# Patient Record
Sex: MALE | Race: WHITE | Employment: FULL TIME | URBAN - METROPOLITAN AREA
[De-identification: names, ages, dates, MRNs, and addresses within clinical notes are randomized per-mention and may not be internally consistent; named-entity substitution may affect disease eponyms.]

---

## 2023-04-14 LAB
ALANINE AMINOTRANSFERASE (SGPT) (U/L) IN SER/PLAS: 29 U/L (ref 10–52)
ALBUMIN (G/DL) IN SER/PLAS: 4.3 G/DL (ref 3.4–5)
ALKALINE PHOSPHATASE (U/L) IN SER/PLAS: 84 U/L (ref 33–120)
ANION GAP IN SER/PLAS: 15 MMOL/L (ref 10–20)
ASPARTATE AMINOTRANSFERASE (SGOT) (U/L) IN SER/PLAS: 35 U/L (ref 9–39)
BILIRUBIN TOTAL (MG/DL) IN SER/PLAS: 0.6 MG/DL (ref 0–1.2)
CALCIUM (MG/DL) IN SER/PLAS: 9.8 MG/DL (ref 8.6–10.6)
CARBON DIOXIDE, TOTAL (MMOL/L) IN SER/PLAS: 27 MMOL/L (ref 21–32)
CHLORIDE (MMOL/L) IN SER/PLAS: 103 MMOL/L (ref 98–107)
CHOLESTEROL (MG/DL) IN SER/PLAS: 179 MG/DL (ref 0–199)
CHOLESTEROL IN HDL (MG/DL) IN SER/PLAS: 63 MG/DL
CHOLESTEROL/HDL RATIO: 2.8
CREATININE (MG/DL) IN SER/PLAS: 1.25 MG/DL (ref 0.5–1.3)
GFR MALE: 68 ML/MIN/1.73M2
GLUCOSE (MG/DL) IN SER/PLAS: 86 MG/DL (ref 74–99)
LDL: 67 MG/DL (ref 0–99)
NON HDL CHOLESTEROL: 116 MG/DL
POTASSIUM (MMOL/L) IN SER/PLAS: 4.6 MMOL/L (ref 3.5–5.3)
PROTEIN TOTAL: 6.9 G/DL (ref 6.4–8.2)
SODIUM (MMOL/L) IN SER/PLAS: 140 MMOL/L (ref 136–145)
TRIGLYCERIDE (MG/DL) IN SER/PLAS: 244 MG/DL (ref 0–149)
UREA NITROGEN (MG/DL) IN SER/PLAS: 17 MG/DL (ref 6–23)
VLDL: 49 MG/DL (ref 0–40)

## 2023-05-02 ENCOUNTER — OFFICE VISIT (OUTPATIENT)
Dept: PRIMARY CARE | Facility: CLINIC | Age: 53
End: 2023-05-02
Payer: COMMERCIAL

## 2023-05-02 ENCOUNTER — LAB (OUTPATIENT)
Dept: LAB | Facility: LAB | Age: 53
End: 2023-05-02
Payer: COMMERCIAL

## 2023-05-02 VITALS
HEIGHT: 72 IN | OXYGEN SATURATION: 96 % | BODY MASS INDEX: 31.29 KG/M2 | SYSTOLIC BLOOD PRESSURE: 124 MMHG | HEART RATE: 90 BPM | WEIGHT: 231 LBS | DIASTOLIC BLOOD PRESSURE: 80 MMHG

## 2023-05-02 DIAGNOSIS — Z13.0 SCREENING FOR DEFICIENCY ANEMIA: ICD-10-CM

## 2023-05-02 DIAGNOSIS — Z13.29 SCREENING FOR THYROID DISORDER: ICD-10-CM

## 2023-05-02 DIAGNOSIS — M10.9 GOUT, UNSPECIFIED CAUSE, UNSPECIFIED CHRONICITY, UNSPECIFIED SITE: ICD-10-CM

## 2023-05-02 DIAGNOSIS — Z23 IMMUNIZATION DUE: ICD-10-CM

## 2023-05-02 DIAGNOSIS — Z13.6 ENCOUNTER FOR SCREENING FOR CARDIOVASCULAR DISORDERS: ICD-10-CM

## 2023-05-02 DIAGNOSIS — E55.9 VITAMIN D DEFICIENCY: ICD-10-CM

## 2023-05-02 DIAGNOSIS — E78.5 HYPERLIPIDEMIA, UNSPECIFIED HYPERLIPIDEMIA TYPE: ICD-10-CM

## 2023-05-02 DIAGNOSIS — Z00.00 ROUTINE GENERAL MEDICAL EXAMINATION AT A HEALTH CARE FACILITY: Primary | ICD-10-CM

## 2023-05-02 DIAGNOSIS — Z12.5 PROSTATE CANCER SCREENING: ICD-10-CM

## 2023-05-02 DIAGNOSIS — Z00.00 ROUTINE GENERAL MEDICAL EXAMINATION AT A HEALTH CARE FACILITY: ICD-10-CM

## 2023-05-02 DIAGNOSIS — I10 PRIMARY HYPERTENSION: ICD-10-CM

## 2023-05-02 DIAGNOSIS — E66.3 OVERWEIGHT: ICD-10-CM

## 2023-05-02 PROBLEM — R07.89 CHEST PAIN, ATYPICAL: Status: ACTIVE | Noted: 2023-05-02

## 2023-05-02 PROBLEM — I25.10 CORONARY ARTERIOSCLEROSIS: Status: ACTIVE | Noted: 2020-07-09

## 2023-05-02 PROBLEM — R03.0 ELEVATED BLOOD-PRESSURE READING WITHOUT DIAGNOSIS OF HYPERTENSION: Status: ACTIVE | Noted: 2020-07-09

## 2023-05-02 PROBLEM — L40.50 PSORIASIS WITH ARTHROPATHY (MULTI): Status: ACTIVE | Noted: 2020-07-09

## 2023-05-02 PROBLEM — K44.9 HIATAL HERNIA: Status: ACTIVE | Noted: 2023-05-02

## 2023-05-02 PROBLEM — R13.10 SWALLOWING PROBLEM: Status: ACTIVE | Noted: 2020-07-09

## 2023-05-02 PROBLEM — I51.89 FAMILIAL HEART DISEASE: Status: ACTIVE | Noted: 2023-05-02

## 2023-05-02 PROBLEM — N52.9 ERECTILE DISORDER: Status: ACTIVE | Noted: 2023-05-02

## 2023-05-02 PROBLEM — G47.33 OSA (OBSTRUCTIVE SLEEP APNEA): Status: ACTIVE | Noted: 2023-05-02

## 2023-05-02 PROBLEM — R93.89 ABNORMAL COMPUTED TOMOGRAPHY SCAN: Status: ACTIVE | Noted: 2020-07-09

## 2023-05-02 PROBLEM — R13.10 DYSPHAGIA: Status: ACTIVE | Noted: 2023-05-02

## 2023-05-02 PROBLEM — M17.9 OSTEOARTHRITIS OF KNEE: Status: ACTIVE | Noted: 2023-05-02

## 2023-05-02 PROBLEM — M54.16 LUMBAR RADICULOPATHY: Status: ACTIVE | Noted: 2020-02-14

## 2023-05-02 PROBLEM — I25.10 CAD (CORONARY ARTERY DISEASE): Status: ACTIVE | Noted: 2023-05-02

## 2023-05-02 PROBLEM — M25.462 EFFUSION, LEFT KNEE: Status: ACTIVE | Noted: 2020-07-09

## 2023-05-02 PROBLEM — R06.83 HABITUAL SNORING: Status: ACTIVE | Noted: 2023-05-02

## 2023-05-02 PROBLEM — E66.9 OBESITY: Status: ACTIVE | Noted: 2020-07-09

## 2023-05-02 PROBLEM — I51.9 HEART DISEASE: Status: ACTIVE | Noted: 2020-07-09

## 2023-05-02 PROBLEM — R29.898 HIP WEAKNESS: Status: ACTIVE | Noted: 2020-02-14

## 2023-05-02 PROBLEM — M12.9 ARTHROPATHY: Status: ACTIVE | Noted: 2023-05-02

## 2023-05-02 PROBLEM — L40.50 PSORIATIC ARTHROPATHY (MULTI): Status: ACTIVE | Noted: 2023-05-02

## 2023-05-02 PROBLEM — R13.10 SWALLOWING PROBLEM: Status: ACTIVE | Noted: 2023-05-02

## 2023-05-02 PROCEDURE — 90750 HZV VACC RECOMBINANT IM: CPT | Performed by: STUDENT IN AN ORGANIZED HEALTH CARE EDUCATION/TRAINING PROGRAM

## 2023-05-02 PROCEDURE — 81001 URINALYSIS AUTO W/SCOPE: CPT

## 2023-05-02 PROCEDURE — 90472 IMMUNIZATION ADMIN EACH ADD: CPT | Performed by: STUDENT IN AN ORGANIZED HEALTH CARE EDUCATION/TRAINING PROGRAM

## 2023-05-02 PROCEDURE — 3079F DIAST BP 80-89 MM HG: CPT | Performed by: STUDENT IN AN ORGANIZED HEALTH CARE EDUCATION/TRAINING PROGRAM

## 2023-05-02 PROCEDURE — 90471 IMMUNIZATION ADMIN: CPT | Performed by: STUDENT IN AN ORGANIZED HEALTH CARE EDUCATION/TRAINING PROGRAM

## 2023-05-02 PROCEDURE — 99396 PREV VISIT EST AGE 40-64: CPT | Performed by: STUDENT IN AN ORGANIZED HEALTH CARE EDUCATION/TRAINING PROGRAM

## 2023-05-02 PROCEDURE — 84550 ASSAY OF BLOOD/URIC ACID: CPT

## 2023-05-02 PROCEDURE — 85027 COMPLETE CBC AUTOMATED: CPT

## 2023-05-02 PROCEDURE — 36415 COLL VENOUS BLD VENIPUNCTURE: CPT

## 2023-05-02 PROCEDURE — 84154 ASSAY OF PSA FREE: CPT

## 2023-05-02 PROCEDURE — 84443 ASSAY THYROID STIM HORMONE: CPT

## 2023-05-02 PROCEDURE — 84153 ASSAY OF PSA TOTAL: CPT

## 2023-05-02 PROCEDURE — 3074F SYST BP LT 130 MM HG: CPT | Performed by: STUDENT IN AN ORGANIZED HEALTH CARE EDUCATION/TRAINING PROGRAM

## 2023-05-02 RX ORDER — MELOXICAM 7.5 MG/1
7.5 TABLET ORAL
COMMUNITY
Start: 2012-03-21 | End: 2023-05-02 | Stop reason: ALTCHOICE

## 2023-05-02 RX ORDER — TAMSULOSIN HYDROCHLORIDE 0.4 MG/1
1 CAPSULE ORAL DAILY
COMMUNITY
Start: 2022-06-11 | End: 2023-05-02 | Stop reason: ALTCHOICE

## 2023-05-02 RX ORDER — LISINOPRIL 20 MG/1
1 TABLET ORAL DAILY
COMMUNITY
Start: 2015-07-06

## 2023-05-02 RX ORDER — AZITHROMYCIN 250 MG/1
TABLET, FILM COATED ORAL
COMMUNITY
Start: 2019-05-14 | End: 2023-05-02 | Stop reason: ALTCHOICE

## 2023-05-02 RX ORDER — ASPIRIN 81 MG/1
1 TABLET ORAL DAILY
COMMUNITY
Start: 2019-06-10

## 2023-05-02 RX ORDER — ATORVASTATIN CALCIUM 80 MG/1
80 TABLET, FILM COATED ORAL DAILY
COMMUNITY
End: 2024-05-01 | Stop reason: SDUPTHER

## 2023-05-02 RX ORDER — ALBUTEROL SULFATE 90 UG/1
2 AEROSOL, METERED RESPIRATORY (INHALATION) EVERY 6 HOURS PRN
COMMUNITY
Start: 2020-02-20 | End: 2023-05-02 | Stop reason: ALTCHOICE

## 2023-05-02 RX ORDER — ATOVAQUONE AND PROGUANIL HYDROCHLORIDE 250; 100 MG/1; MG/1
TABLET, FILM COATED ORAL
COMMUNITY
Start: 2019-05-14 | End: 2023-05-02 | Stop reason: ALTCHOICE

## 2023-05-02 RX ORDER — KETOROLAC TROMETHAMINE 10 MG/1
1 TABLET, FILM COATED ORAL EVERY 8 HOURS PRN
COMMUNITY
Start: 2022-06-11 | End: 2023-05-02 | Stop reason: ALTCHOICE

## 2023-05-02 RX ORDER — COLCHICINE 0.6 MG/1
TABLET ORAL
COMMUNITY
End: 2023-05-02 | Stop reason: ALTCHOICE

## 2023-05-02 RX ORDER — CELECOXIB 200 MG/1
1 CAPSULE ORAL DAILY
COMMUNITY
Start: 2018-02-21 | End: 2023-10-31 | Stop reason: SDUPTHER

## 2023-05-02 RX ORDER — ALLOPURINOL 100 MG/1
1 TABLET ORAL DAILY
COMMUNITY
Start: 2022-11-02 | End: 2023-05-02 | Stop reason: ALTCHOICE

## 2023-05-02 RX ORDER — METHYLPREDNISOLONE 4 MG/1
TABLET ORAL
COMMUNITY
Start: 2022-11-02 | End: 2023-05-02 | Stop reason: ALTCHOICE

## 2023-05-02 RX ORDER — SILDENAFIL CITRATE 20 MG/1
TABLET ORAL
COMMUNITY
Start: 2021-04-01 | End: 2023-05-02 | Stop reason: ALTCHOICE

## 2023-05-02 RX ORDER — ALLOPURINOL 300 MG/1
300 TABLET ORAL DAILY
COMMUNITY
End: 2023-12-07 | Stop reason: SDUPTHER

## 2023-05-02 NOTE — PATIENT INSTRUCTIONS
1. Wellness visit.  No concerns on exam.  Screening labs ordered today please be fasting.  Labs from cardiology showed excellent cholesterol panel.  Colonoscopy is up-to-date until 2029.  First Shingrix vaccine given today.  Follow-up in 2 months either with your pharmacy or here for a nurse visit for second shot.    2. Continue routine cardiology visits.  Continue current meds.  Call if refills needed.     3. Arthritis currently under good control on meds.  Has adjusted diet improved his exercise.  Can continue routine follow-ups with rheumatology.

## 2023-05-02 NOTE — PROGRESS NOTES
"Subjective   Patient ID: Ryan Moore is a 52 y.o. male who presents for Annual Exam (He is not fasting.).    HPI comes in for wellness visit.    Review of Systems  Constitutional: NO F, chills, or sweats  Eyes: no blurred vision or visual disturbance  ENT: no hearing loss, no congestion, no nasal discharge, no hoarseness and no sore throat.   Cardiovascular: no chest pain, no edema, no palps and no syncope.   Respiratory: no cough,no s.o.b. and no wheezing  Gastrointestinal: no abdominal pain, No C/D no N/V, no blood in stools  Genitourinary: no dysuria, no change in urinary frequency, no urinary hesitancy and no feelings of urinary urgency.   Musculoskeletal: no arthralgias,  no back pain and no myalgias.   Integumentary: no new skin lesions and no rashes.   Neurological: no difficulty walking, no headache, no limb weakness, no numbness and no tingling.   Psychiatric: no anxiety, no depression, no anhedonia and no substance use disorders.   Endocrine: no recent weight gain and no recent weight loss.   Hematologic/Lymphatic: no tendency for easy bruising and no swollen glands.  Objective   /80 (BP Location: Left arm, Patient Position: Sitting, BP Cuff Size: Large adult)   Pulse 90   Ht 1.826 m (5' 11.89\")   Wt 105 kg (231 lb)   SpO2 96%   BMI 31.43 kg/m²     Physical Exam  gen- a & o x 3, nad, pleasant  heent- eomi, perrla, ear canals patent, TM's non-erythematous, no fluid, frontal and maxillary sinus's nontender  neck- supple, nontender, no palpable or enlarged nodes, no thyromegaly  heart- rrr, no murmurs  lungs- cta b/l , no w/r/r  chest- symmetric, nontender  ab- soft, nontender, no palpable organomegaly, postive bowel sounds  ex's- no c/c/e  neuro- CNs 2-12 grossly intact, full sensation and strength in all extremities    Assessment/Plan     1. Wellness visit.  No concerns on exam.  Screening labs ordered today please be fasting.  Labs from cardiology showed excellent cholesterol panel.  Colonoscopy " is up-to-date until 2029.  First Shingrix vaccine given today.  Follow-up in 2 months either with your pharmacy or here for a nurse visit for second shot.    2. Continue routine cardiology visits.  Continue current meds.  Call if refills needed.     3. Arthritis currently under good control on meds.  Has adjusted diet improved his exercise.  Can continue routine follow-ups with rheumatology.

## 2023-05-03 LAB
APPEARANCE, URINE: ABNORMAL
BILIRUBIN, URINE: NEGATIVE
BLOOD, URINE: ABNORMAL
COLOR, URINE: ABNORMAL
ERYTHROCYTE DISTRIBUTION WIDTH (RATIO) BY AUTOMATED COUNT: 11.9 % (ref 11.5–14.5)
ERYTHROCYTE MEAN CORPUSCULAR HEMOGLOBIN CONCENTRATION (G/DL) BY AUTOMATED: 32.2 G/DL (ref 32–36)
ERYTHROCYTE MEAN CORPUSCULAR VOLUME (FL) BY AUTOMATED COUNT: 97 FL (ref 80–100)
ERYTHROCYTES (10*6/UL) IN BLOOD BY AUTOMATED COUNT: 4.38 X10E12/L (ref 4.5–5.9)
GLUCOSE, URINE: NEGATIVE MG/DL
HEMATOCRIT (%) IN BLOOD BY AUTOMATED COUNT: 42.5 % (ref 41–52)
HEMOGLOBIN (G/DL) IN BLOOD: 13.7 G/DL (ref 13.5–17.5)
KETONES, URINE: NEGATIVE MG/DL
LEUKOCYTE ESTERASE, URINE: NEGATIVE
LEUKOCYTES (10*3/UL) IN BLOOD BY AUTOMATED COUNT: 8.3 X10E9/L (ref 4.4–11.3)
MUCUS, URINE: NORMAL /LPF
NITRITE, URINE: NEGATIVE
NRBC (PER 100 WBCS) BY AUTOMATED COUNT: 0 /100 WBC (ref 0–0)
PH, URINE: 6 (ref 5–8)
PLATELETS (10*3/UL) IN BLOOD AUTOMATED COUNT: 217 X10E9/L (ref 150–450)
PROTEIN, URINE: NEGATIVE MG/DL
RBC, URINE: 1 /HPF (ref 0–5)
SPECIFIC GRAVITY, URINE: 1.02 (ref 1–1.03)
SQUAMOUS EPITHELIAL CELLS, URINE: <1 /HPF
THYROTROPIN (MIU/L) IN SER/PLAS BY DETECTION LIMIT <= 0.05 MIU/L: 2.21 MIU/L (ref 0.44–3.98)
URATE (MG/DL) IN SER/PLAS: 5.3 MG/DL (ref 4–7.5)
UROBILINOGEN, URINE: <2 MG/DL (ref 0–1.9)
WBC, URINE: 1 /HPF (ref 0–5)

## 2023-05-06 LAB
PROSTATE SPECIFIC AG (NG/ML) IN SER/PLAS: 0.4 NG/ML (ref 0–4)
PROSTATE SPECIFIC AG FREE (NG/ML) IN SER/PLAS: 0.2 NG/ML
PROSTATE SPECIFIC AG FREE/PROSTATE SPECIFIC AG TOTAL IN SER/PLAS: 50 %

## 2023-10-31 DIAGNOSIS — M12.9 ARTHROPATHY: Primary | ICD-10-CM

## 2023-10-31 RX ORDER — CELECOXIB 200 MG/1
200 CAPSULE ORAL DAILY
Qty: 30 CAPSULE | Refills: 0 | Status: SHIPPED | OUTPATIENT
Start: 2023-10-31 | End: 2023-12-07 | Stop reason: SDUPTHER

## 2023-11-29 ENCOUNTER — APPOINTMENT (OUTPATIENT)
Dept: RHEUMATOLOGY | Facility: CLINIC | Age: 53
End: 2023-11-29
Payer: COMMERCIAL

## 2023-12-05 PROBLEM — M65.979 TENOSYNOVITIS OF ANKLE: Status: ACTIVE | Noted: 2023-12-05

## 2023-12-05 PROBLEM — M65.9 TENOSYNOVITIS OF ANKLE: Status: ACTIVE | Noted: 2023-12-05

## 2023-12-05 PROBLEM — C44.329 SQUAMOUS CELL CARCINOMA OF SKIN OF OTHER PARTS OF FACE: Status: ACTIVE | Noted: 2021-10-21

## 2023-12-05 PROBLEM — D48.5 NEOPLASM OF UNCERTAIN BEHAVIOR OF SKIN: Status: ACTIVE | Noted: 2021-10-21

## 2023-12-05 RX ORDER — SILDENAFIL CITRATE 20 MG/1
TABLET ORAL
COMMUNITY
Start: 2021-04-01 | End: 2024-04-25

## 2023-12-06 ENCOUNTER — OFFICE VISIT (OUTPATIENT)
Dept: RHEUMATOLOGY | Facility: CLINIC | Age: 53
End: 2023-12-06
Payer: COMMERCIAL

## 2023-12-06 ENCOUNTER — LAB (OUTPATIENT)
Dept: LAB | Facility: LAB | Age: 53
End: 2023-12-06
Payer: COMMERCIAL

## 2023-12-06 VITALS
WEIGHT: 233.7 LBS | DIASTOLIC BLOOD PRESSURE: 96 MMHG | HEART RATE: 85 BPM | BODY MASS INDEX: 29.99 KG/M2 | SYSTOLIC BLOOD PRESSURE: 171 MMHG | HEIGHT: 74 IN

## 2023-12-06 DIAGNOSIS — M10.9 GOUT, UNSPECIFIED CAUSE, UNSPECIFIED CHRONICITY, UNSPECIFIED SITE: ICD-10-CM

## 2023-12-06 DIAGNOSIS — L40.50 PSORIATIC ARTHROPATHY (MULTI): ICD-10-CM

## 2023-12-06 DIAGNOSIS — M17.0 OSTEOARTHRITIS OF BOTH KNEES, UNSPECIFIED OSTEOARTHRITIS TYPE: ICD-10-CM

## 2023-12-06 DIAGNOSIS — L40.50 PSORIATIC ARTHROPATHY (MULTI): Primary | ICD-10-CM

## 2023-12-06 LAB
25(OH)D3 SERPL-MCNC: 26 NG/ML (ref 30–100)
ALBUMIN SERPL BCP-MCNC: 4.2 G/DL (ref 3.4–5)
ALP SERPL-CCNC: 84 U/L (ref 33–120)
ALT SERPL W P-5'-P-CCNC: 16 U/L (ref 10–52)
ANION GAP SERPL CALC-SCNC: 14 MMOL/L (ref 10–20)
AST SERPL W P-5'-P-CCNC: 20 U/L (ref 9–39)
BILIRUB SERPL-MCNC: 0.6 MG/DL (ref 0–1.2)
BUN SERPL-MCNC: 19 MG/DL (ref 6–23)
CALCIUM SERPL-MCNC: 9.8 MG/DL (ref 8.6–10.6)
CHLORIDE SERPL-SCNC: 104 MMOL/L (ref 98–107)
CK SERPL-CCNC: 103 U/L (ref 0–325)
CO2 SERPL-SCNC: 28 MMOL/L (ref 21–32)
CREAT SERPL-MCNC: 1.27 MG/DL (ref 0.5–1.3)
ERYTHROCYTE [DISTWIDTH] IN BLOOD BY AUTOMATED COUNT: 12 % (ref 11.5–14.5)
GFR SERPL CREATININE-BSD FRML MDRD: 67 ML/MIN/1.73M*2
GLUCOSE SERPL-MCNC: 86 MG/DL (ref 74–99)
HCT VFR BLD AUTO: 44.7 % (ref 41–52)
HGB BLD-MCNC: 14.3 G/DL (ref 13.5–17.5)
MCH RBC QN AUTO: 31.4 PG (ref 26–34)
MCHC RBC AUTO-ENTMCNC: 32 G/DL (ref 32–36)
MCV RBC AUTO: 98 FL (ref 80–100)
NRBC BLD-RTO: 0 /100 WBCS (ref 0–0)
PLATELET # BLD AUTO: 188 X10*3/UL (ref 150–450)
POTASSIUM SERPL-SCNC: 4.7 MMOL/L (ref 3.5–5.3)
PROT SERPL-MCNC: 7 G/DL (ref 6.4–8.2)
RBC # BLD AUTO: 4.56 X10*6/UL (ref 4.5–5.9)
SODIUM SERPL-SCNC: 141 MMOL/L (ref 136–145)
URATE SERPL-MCNC: 4.5 MG/DL (ref 4–7.5)
WBC # BLD AUTO: 7.3 X10*3/UL (ref 4.4–11.3)

## 2023-12-06 PROCEDURE — 3077F SYST BP >= 140 MM HG: CPT | Performed by: INTERNAL MEDICINE

## 2023-12-06 PROCEDURE — 36415 COLL VENOUS BLD VENIPUNCTURE: CPT

## 2023-12-06 PROCEDURE — 3080F DIAST BP >= 90 MM HG: CPT | Performed by: INTERNAL MEDICINE

## 2023-12-06 PROCEDURE — 82306 VITAMIN D 25 HYDROXY: CPT

## 2023-12-06 PROCEDURE — 85027 COMPLETE CBC AUTOMATED: CPT

## 2023-12-06 PROCEDURE — 84550 ASSAY OF BLOOD/URIC ACID: CPT

## 2023-12-06 PROCEDURE — 80053 COMPREHEN METABOLIC PANEL: CPT

## 2023-12-06 PROCEDURE — 82550 ASSAY OF CK (CPK): CPT

## 2023-12-06 PROCEDURE — 99214 OFFICE O/P EST MOD 30 MIN: CPT | Performed by: INTERNAL MEDICINE

## 2023-12-06 NOTE — PROGRESS NOTES
Follow-up Rheumatology Patient Visit    Chief Complaint:  Ryan Moore is a 53 y.o. male presenting today for Follow-up.    History of Presenting Problem:   54 y/o male with Psoriasis and Gout present for f/u. He had been tolerating Allopurinol and Celebrex 200 mg daily  He denies any acute issues in his joints.  He reports no limitation with joint range of motion occasionally will hear a lot of joint noises in the left knee.  However he is able to do a lot of activities including yoga.  He notices occasional muscle cramps      Problem List:   Patient Active Problem List   Diagnosis    Erectile disorder    Arthropathy    CAD (coronary artery disease)    Chest pain, atypical    Dysphagia    Familial heart disease    HTN (hypertension)    Gout    Habitual snoring    Hiatal hernia    Hyperlipidemia    ULISES (obstructive sleep apnea)    Osteoarthritis of knee    Psoriatic arthropathy (CMS/HCC)    Swallowing problem    Vitamin D deficiency    Abnormal computed tomography scan    Effusion, left knee    Elevated blood-pressure reading without diagnosis of hypertension    Lumbar radiculopathy    Obesity    Overweight    Hip weakness    Coronary arteriosclerosis    Atypical chest pain    Heart disease    Psoriasis with arthropathy (CMS/HCC)    Tenosynovitis of foot    Squamous cell carcinoma of skin of other parts of face    Neoplasm of uncertain behavior of skin    Tenosynovitis of ankle       Past Medical History:   Past Medical History:   Diagnosis Date    Arthropathic psoriasis, unspecified (CMS/HCC) 11/24/2013    Psoriatic arthropathy    Arthropathic psoriasis, unspecified (CMS/HCC) 02/21/2018    Psoriasis with arthropathy    Effusion, left knee 08/22/2018    Effusion of left knee    Encounter for health counseling related to travel 05/14/2019    Counseling for travel    Encounter for screening for malignant neoplasm of rectum     Screening for malignant neoplasm of the rectum    Other long term (current) drug therapy      "High risk medication use    Overweight 12/11/2019    Overweight    Personal history of diseases of the skin and subcutaneous tissue     History of psoriasis    Personal history of other diseases of the respiratory system 05/31/2017    History of sore throat    Personal history of other endocrine, nutritional and metabolic disease 06/23/2015    History of hypercholesterolemia    Personal history of other specified conditions 03/22/2021    History of chest pain       Surgical History:   Past Surgical History:   Procedure Laterality Date    APPENDECTOMY  06/10/2019    Appendectomy    KNEE ARTHROSCOPY W/ DEBRIDEMENT  06/10/2019    Arthroscopy Knee Left    REFRACTIVE SURGERY  06/10/2019    Corneal LASIK        Allergies: No Known Allergies    Medications:   Current Outpatient Medications:     allopurinol (Zyloprim) 300 mg tablet, Take 1 tablet (300 mg) by mouth once daily. as directed, Disp: , Rfl:     aspirin 81 mg EC tablet, Take 1 tablet (81 mg) by mouth once daily., Disp: , Rfl:     atorvastatin (Lipitor) 80 mg tablet, Take 1 tablet (80 mg) by mouth once daily., Disp: , Rfl:     celecoxib (CeleBREX) 200 mg capsule, Take 1 capsule (200 mg) by mouth once daily., Disp: 30 capsule, Rfl: 0    cetirizine (ZyrTEC) 2.5 MG split tablet, Take by mouth., Disp: , Rfl:     lisinopril 20 mg tablet, Take 1 tablet (20 mg) by mouth once daily., Disp: , Rfl:     sildenafil (Revatio) 20 mg tablet, Take by mouth., Disp: , Rfl:       Objective   Physical Examination:    Visit Vitals  BP (!) 171/96   Pulse 85   Ht 1.88 m (6' 2\")   Wt 106 kg (233 lb 11.2 oz)   BMI 30.01 kg/m²   Smoking Status Never Assessed   BSA 2.35 m²        Gen: NAD, A&O x 3  HEENT: clear sclera and conjunctiva,     Musculoskeletal:   Neck; WNL, full ROM  Shoulder: WNL, full ROM  Elbow:WNL, full ROM, no effusion noted  Wrist and fingers;no active synovitis noted, Full ROM in the Wrist , Good fist and   Knees:  No effusions or crepitation, full ROM.  Hips; WNL, full " ROM, Negative Chet test  Ankle, Feet; WNL, full ROM    Skin: No rashes or lesions seen, no nail changes  Neuro: A&O x3, Normal Gait    Procedures :None    Orders:  Orders Placed This Encounter   Procedures    Uric Acid    Creatine Kinase    Vitamin D 25-Hydroxy,Total (for eval of Vitamin D levels)    Comprehensive Metabolic Panel    CBC        Provider Impression:   Assessment/Plan   Encounter Diagnoses   Name Primary?    Psoriatic arthropathy (CMS/HCC) Yes    Gout, unspecified cause, unspecified chronicity, unspecified site     Osteoarthritis of both knees, unspecified osteoarthritis type         52 y/o male with Hx of seronegative inflammatory arthritis, Hx of Psoriasis present with joint complaint in the left knee and thumb, which seem to be resolved with Diet change. Current work show show joint changes consistent with OA and and also possible inflammatory arthritis given joint changes in symmetric joints including in the wrist. Also noted to have elevated uric acid which suggested underlying gout. MRI show tricompartment arthritis and ligament derangement.   -Continue Celebrex 200 mg daily as needed can try to take it every other day, discuss PPI therapy for GI prophylaxis  -Continue Allopurinol, patient was still on 300 mg, last note indicated we had recommended increasing to 400 however we will repeat uric acid and go from there  -F/u in 6 months

## 2023-12-07 ENCOUNTER — TELEPHONE (OUTPATIENT)
Dept: RHEUMATOLOGY | Facility: CLINIC | Age: 53
End: 2023-12-07
Payer: COMMERCIAL

## 2023-12-07 DIAGNOSIS — M12.9 ARTHROPATHY: ICD-10-CM

## 2023-12-07 DIAGNOSIS — E55.9 VITAMIN D DEFICIENCY: Primary | ICD-10-CM

## 2023-12-07 DIAGNOSIS — M10.9 GOUT, UNSPECIFIED CAUSE, UNSPECIFIED CHRONICITY, UNSPECIFIED SITE: Primary | ICD-10-CM

## 2023-12-07 RX ORDER — ALLOPURINOL 300 MG/1
300 TABLET ORAL DAILY
Qty: 90 TABLET | Refills: 1 | Status: SHIPPED | OUTPATIENT
Start: 2023-12-07 | End: 2024-03-06

## 2023-12-07 RX ORDER — ERGOCALCIFEROL 1.25 MG/1
50000 CAPSULE ORAL
Qty: 4 CAPSULE | Refills: 0 | Status: SHIPPED | OUTPATIENT
Start: 2023-12-07 | End: 2024-01-04

## 2023-12-07 RX ORDER — CELECOXIB 200 MG/1
200 CAPSULE ORAL DAILY
Qty: 90 CAPSULE | Refills: 0 | Status: SHIPPED | OUTPATIENT
Start: 2023-12-07 | End: 2024-03-06

## 2023-12-07 NOTE — TELEPHONE ENCOUNTER
No answer, left this detailed message on patient voicemail  ----- Message from Mirlande Olson DO sent at 12/7/2023 10:19 AM EST -----  Please let patient know that  vitamin D is low, recommend they take high-dose vitamin D to improve their levels which will help immune system, bones and joints  Send 50,000 units weekly  Q:4  R:0  When they finish high dose vitamin D to start taking 3000 units daily OTC

## 2024-02-26 ENCOUNTER — APPOINTMENT (OUTPATIENT)
Dept: SLEEP MEDICINE | Facility: CLINIC | Age: 54
End: 2024-02-26
Payer: COMMERCIAL

## 2024-03-25 ENCOUNTER — APPOINTMENT (OUTPATIENT)
Dept: SLEEP MEDICINE | Facility: CLINIC | Age: 54
End: 2024-03-25
Payer: COMMERCIAL

## 2024-04-16 DIAGNOSIS — M17.0 OSTEOARTHRITIS OF BOTH KNEES, UNSPECIFIED OSTEOARTHRITIS TYPE: Primary | ICD-10-CM

## 2024-04-16 RX ORDER — CELECOXIB 200 MG/1
200 CAPSULE ORAL DAILY
Qty: 30 CAPSULE | Refills: 1 | Status: SHIPPED | OUTPATIENT
Start: 2024-04-16 | End: 2024-06-05 | Stop reason: SDUPTHER

## 2024-04-25 DIAGNOSIS — N52.9 ERECTILE DYSFUNCTION, UNSPECIFIED ERECTILE DYSFUNCTION TYPE: Primary | ICD-10-CM

## 2024-04-25 RX ORDER — ALLOPURINOL 300 MG/1
TABLET ORAL
COMMUNITY
Start: 2024-04-05 | End: 2024-06-05 | Stop reason: SDUPTHER

## 2024-04-25 RX ORDER — SILDENAFIL CITRATE 20 MG/1
TABLET ORAL
Qty: 50 TABLET | Refills: 11 | Status: SHIPPED | OUTPATIENT
Start: 2024-04-25

## 2024-05-01 DIAGNOSIS — E78.5 HYPERLIPIDEMIA, UNSPECIFIED HYPERLIPIDEMIA TYPE: Primary | ICD-10-CM

## 2024-05-01 RX ORDER — ATORVASTATIN CALCIUM 80 MG/1
80 TABLET, FILM COATED ORAL DAILY
Qty: 90 TABLET | Refills: 3 | Status: SHIPPED | OUTPATIENT
Start: 2024-05-01

## 2024-06-05 ENCOUNTER — OFFICE VISIT (OUTPATIENT)
Dept: RHEUMATOLOGY | Facility: CLINIC | Age: 54
End: 2024-06-05
Payer: COMMERCIAL

## 2024-06-05 ENCOUNTER — LAB (OUTPATIENT)
Dept: LAB | Facility: LAB | Age: 54
End: 2024-06-05
Payer: COMMERCIAL

## 2024-06-05 VITALS
BODY MASS INDEX: 29.77 KG/M2 | HEIGHT: 74 IN | HEART RATE: 111 BPM | DIASTOLIC BLOOD PRESSURE: 80 MMHG | SYSTOLIC BLOOD PRESSURE: 148 MMHG | WEIGHT: 232 LBS | OXYGEN SATURATION: 94 % | TEMPERATURE: 98.2 F

## 2024-06-05 DIAGNOSIS — M25.562 CHRONIC PAIN OF LEFT KNEE: ICD-10-CM

## 2024-06-05 DIAGNOSIS — M10.9 GOUT, UNSPECIFIED CAUSE, UNSPECIFIED CHRONICITY, UNSPECIFIED SITE: ICD-10-CM

## 2024-06-05 DIAGNOSIS — G89.29 CHRONIC PAIN OF LEFT KNEE: ICD-10-CM

## 2024-06-05 DIAGNOSIS — E55.9 VITAMIN D DEFICIENCY: ICD-10-CM

## 2024-06-05 DIAGNOSIS — L40.50 PSORIATIC ARTHROPATHY (MULTI): ICD-10-CM

## 2024-06-05 DIAGNOSIS — M17.0 OSTEOARTHRITIS OF BOTH KNEES, UNSPECIFIED OSTEOARTHRITIS TYPE: ICD-10-CM

## 2024-06-05 DIAGNOSIS — M10.9 GOUT, UNSPECIFIED CAUSE, UNSPECIFIED CHRONICITY, UNSPECIFIED SITE: Primary | ICD-10-CM

## 2024-06-05 PROBLEM — M51.26 LUMBAR DISC HERNIATION: Status: ACTIVE | Noted: 2024-06-05

## 2024-06-05 LAB
25(OH)D3 SERPL-MCNC: 25 NG/ML (ref 30–100)
ALBUMIN SERPL BCP-MCNC: 4.6 G/DL (ref 3.4–5)
ALP SERPL-CCNC: 81 U/L (ref 33–120)
ALT SERPL W P-5'-P-CCNC: 30 U/L (ref 10–52)
ANION GAP SERPL CALC-SCNC: 16 MMOL/L (ref 10–20)
AST SERPL W P-5'-P-CCNC: 27 U/L (ref 9–39)
BILIRUB SERPL-MCNC: 0.6 MG/DL (ref 0–1.2)
BUN SERPL-MCNC: 30 MG/DL (ref 6–23)
CALCIUM SERPL-MCNC: 9.8 MG/DL (ref 8.6–10.6)
CHLORIDE SERPL-SCNC: 103 MMOL/L (ref 98–107)
CO2 SERPL-SCNC: 24 MMOL/L (ref 21–32)
CREAT SERPL-MCNC: 1.88 MG/DL (ref 0.5–1.3)
CRP SERPL-MCNC: 0.32 MG/DL
EGFRCR SERPLBLD CKD-EPI 2021: 42 ML/MIN/1.73M*2
ERYTHROCYTE [DISTWIDTH] IN BLOOD BY AUTOMATED COUNT: 12.5 % (ref 11.5–14.5)
ERYTHROCYTE [SEDIMENTATION RATE] IN BLOOD BY WESTERGREN METHOD: 29 MM/H (ref 0–20)
GLUCOSE SERPL-MCNC: 127 MG/DL (ref 74–99)
HCT VFR BLD AUTO: 44.1 % (ref 41–52)
HGB BLD-MCNC: 14.9 G/DL (ref 13.5–17.5)
MCH RBC QN AUTO: 32.1 PG (ref 26–34)
MCHC RBC AUTO-ENTMCNC: 33.8 G/DL (ref 32–36)
MCV RBC AUTO: 95 FL (ref 80–100)
NRBC BLD-RTO: 0 /100 WBCS (ref 0–0)
PLATELET # BLD AUTO: 217 X10*3/UL (ref 150–450)
POTASSIUM SERPL-SCNC: 4.2 MMOL/L (ref 3.5–5.3)
PROT SERPL-MCNC: 7.1 G/DL (ref 6.4–8.2)
RBC # BLD AUTO: 4.64 X10*6/UL (ref 4.5–5.9)
SODIUM SERPL-SCNC: 139 MMOL/L (ref 136–145)
URATE SERPL-MCNC: 6.1 MG/DL (ref 4–7.5)
WBC # BLD AUTO: 10 X10*3/UL (ref 4.4–11.3)

## 2024-06-05 PROCEDURE — 84550 ASSAY OF BLOOD/URIC ACID: CPT

## 2024-06-05 PROCEDURE — 1036F TOBACCO NON-USER: CPT | Performed by: INTERNAL MEDICINE

## 2024-06-05 PROCEDURE — 86140 C-REACTIVE PROTEIN: CPT

## 2024-06-05 PROCEDURE — 80053 COMPREHEN METABOLIC PANEL: CPT

## 2024-06-05 PROCEDURE — 36415 COLL VENOUS BLD VENIPUNCTURE: CPT

## 2024-06-05 PROCEDURE — 99214 OFFICE O/P EST MOD 30 MIN: CPT | Performed by: INTERNAL MEDICINE

## 2024-06-05 PROCEDURE — 85652 RBC SED RATE AUTOMATED: CPT

## 2024-06-05 PROCEDURE — 85027 COMPLETE CBC AUTOMATED: CPT

## 2024-06-05 PROCEDURE — 20611 DRAIN/INJ JOINT/BURSA W/US: CPT | Performed by: INTERNAL MEDICINE

## 2024-06-05 PROCEDURE — 82306 VITAMIN D 25 HYDROXY: CPT

## 2024-06-05 PROCEDURE — 3077F SYST BP >= 140 MM HG: CPT | Performed by: INTERNAL MEDICINE

## 2024-06-05 PROCEDURE — 3079F DIAST BP 80-89 MM HG: CPT | Performed by: INTERNAL MEDICINE

## 2024-06-05 RX ORDER — CELECOXIB 200 MG/1
200 CAPSULE ORAL DAILY
Qty: 90 CAPSULE | Refills: 1 | Status: SHIPPED | OUTPATIENT
Start: 2024-06-05 | End: 2024-09-03

## 2024-06-05 RX ORDER — LIDOCAINE HYDROCHLORIDE 10 MG/ML
1 INJECTION INFILTRATION; PERINEURAL
Status: COMPLETED | OUTPATIENT
Start: 2024-06-05 | End: 2024-06-05

## 2024-06-05 RX ORDER — TRIAMCINOLONE ACETONIDE 40 MG/ML
40 INJECTION, SUSPENSION INTRA-ARTICULAR; INTRAMUSCULAR
Status: COMPLETED | OUTPATIENT
Start: 2024-06-05 | End: 2024-06-05

## 2024-06-05 RX ORDER — ALLOPURINOL 300 MG/1
300 TABLET ORAL DAILY
Qty: 90 TABLET | Refills: 1 | Status: SHIPPED | OUTPATIENT
Start: 2024-06-05 | End: 2024-09-03

## 2024-06-05 RX ADMIN — LIDOCAINE HYDROCHLORIDE 1 ML: 10 INJECTION INFILTRATION; PERINEURAL at 15:32

## 2024-06-05 RX ADMIN — TRIAMCINOLONE ACETONIDE 40 MG: 40 INJECTION, SUSPENSION INTRA-ARTICULAR; INTRAMUSCULAR at 15:32

## 2024-06-05 NOTE — PROGRESS NOTES
Follow-up Rheumatology Patient Visit    Chief Complaint:  Ryan Moore is a 53 y.o. male presenting today for Follow-up (6 Month follow up ).    History of Presenting Problem:   54 y/o male with Psoriasis and Gout present for f/u. He had been tolerating Allopurinol and Celebrex 200 mg daily  He denies any acute issues in his joints.  He reports no limitation with joint range of motion occasionally will hear a lot of joint noises in the left knee.  He is experiencing more pain and stiffness in this joint lately and is interested in a joint injection.  He recently was diagnosed with lumbar herniation between L4 and L5 and is received an injection and PT from pain management.      Problem List:   Patient Active Problem List   Diagnosis    Erectile disorder    Arthropathy    CAD (coronary artery disease)    Chest pain, atypical    Dysphagia    Familial heart disease    HTN (hypertension)    Gout    Habitual snoring    Hiatal hernia    Hyperlipidemia    ULISES (obstructive sleep apnea)    Osteoarthritis of knee    Psoriatic arthropathy (Multi)    Swallowing problem    Vitamin D deficiency    Abnormal computed tomography scan    Effusion, left knee    Elevated blood-pressure reading without diagnosis of hypertension    Lumbar radiculopathy    Obesity    Overweight    Hip weakness    Coronary arteriosclerosis    Atypical chest pain    Heart disease    Psoriasis with arthropathy (Multi)    Tenosynovitis of foot    Squamous cell carcinoma of skin of other parts of face    Neoplasm of uncertain behavior of skin    Tenosynovitis of ankle    Lumbar disc herniation       Past Medical History:   Past Medical History:   Diagnosis Date    Arthropathic psoriasis, unspecified (Multi) 11/24/2013    Psoriatic arthropathy    Arthropathic psoriasis, unspecified (Multi) 02/21/2018    Psoriasis with arthropathy    Coronary artery disease     Effusion, left knee 08/22/2018    Effusion of left knee    Encounter for health counseling related to  "travel 05/14/2019    Counseling for travel    Encounter for screening for malignant neoplasm of rectum     Screening for malignant neoplasm of the rectum    Hypertension     Other long term (current) drug therapy     High risk medication use    Overweight 12/11/2019    Overweight    Personal history of diseases of the skin and subcutaneous tissue     History of psoriasis    Personal history of other diseases of the respiratory system 05/31/2017    History of sore throat    Personal history of other endocrine, nutritional and metabolic disease 06/23/2015    History of hypercholesterolemia    Personal history of other specified conditions 03/22/2021    History of chest pain    Sciatica        Surgical History:   Past Surgical History:   Procedure Laterality Date    ANTERIOR CRUCIATE LIGAMENT REPAIR      APPENDECTOMY  06/10/2019    Appendectomy    KNEE ARTHROSCOPY W/ DEBRIDEMENT  06/10/2019    Arthroscopy Knee Left    REFRACTIVE SURGERY  06/10/2019    Corneal LASIK        Allergies: No Known Allergies    Medications:   Current Outpatient Medications:     allopurinol (Zyloprim) 300 mg tablet, , Disp: , Rfl:     aspirin 81 mg EC tablet, Take 1 tablet (81 mg) by mouth once daily., Disp: , Rfl:     atorvastatin (Lipitor) 80 mg tablet, Take 1 tablet (80 mg) by mouth once daily., Disp: 90 tablet, Rfl: 3    celecoxib (CeleBREX) 200 mg capsule, Take 1 capsule (200 mg) by mouth once daily., Disp: 30 capsule, Rfl: 1    cetirizine (ZyrTEC) 2.5 MG split tablet, Take by mouth., Disp: , Rfl:     lisinopril 20 mg tablet, Take 1 tablet (20 mg) by mouth once daily., Disp: , Rfl:     sildenafil (Revatio) 20 mg tablet, TAKE 2 TO 5 TABLETS BY MOUTH ONCE DAILY AS NEEDED FOR SEXUALY ACTIVITY, Disp: 50 tablet, Rfl: 11      Objective   Physical Examination:    Visit Vitals  /80 (BP Location: Left arm, Patient Position: Sitting, BP Cuff Size: Adult)   Pulse (!) 111   Temp 36.8 °C (98.2 °F) (Temporal)   Ht 1.88 m (6' 2\")   Wt 105 kg (232 " lb)   SpO2 94%   BMI 29.79 kg/m²   Smoking Status Never   BSA 2.34 m²        Gen: NAD, A&O x 3  HEENT: clear sclera and conjunctiva,     Musculoskeletal:   Neck; WNL, full ROM  Shoulder: WNL, full ROM  Elbow:WNL, full ROM, no effusion noted  Wrist and fingers;no active synovitis noted, Full ROM in the Wrist , Good fist and   Knees:  No effusions or crepitation, full ROM.  Hips; WNL, full ROM, Negative Chet test  Ankle, Feet; WNL, full ROM    Skin: No rashes or lesions seen, no nail changes  Neuro: A&O x3, Normal Gait    Large Joint Injection/Arthrocentesis: L knee on 6/5/2024 3:32 PM  Indications: pain and joint swelling  Details: 21 G needle, ultrasound-guided medial approach  Medications: 40 mg triamcinolone acetonide 40 mg/mL; 1 mL lidocaine 10 mg/mL (1 %)    Procedure, treatment alternatives, risks and benefits explained, specific risks discussed. Consent was given by the patient. Immediately prior to procedure a time out was called to verify the correct patient and procedure.     Preparation: alcohol was used to prep the area and betadine was used to prep the area. Ethyl chloride spray was used as a topical anesthetic.   Procedure Note: Using sterile technique, the aspiration/injection needle was then directed from a medial aspect. The procedure was ultrasound guided.  Was used to inject 1 mL of 1% Lidocaine and 1 mL of 40mg/mL triamcinolone. A bandage was applied.   Fluid Aspirated:   Post-Procedure: the patient tolerated the procedure well.   Complications: None.   Patient instructed to avoid strenuous activity for 1 day(s).    Consent was given by the patient.        :None    Orders:  Orders Placed This Encounter   Procedures    Large Joint Injection/Arthrocentesis        Provider Impression:   Assessment/Plan   Encounter Diagnoses   Name Primary?    Osteoarthritis of both knees, unspecified osteoarthritis type     Gout, unspecified cause, unspecified chronicity, unspecified site Yes    Psoriatic  arthropathy (Multi)           52 y/o male with Hx of seronegative inflammatory arthritis, Hx of Psoriasis present with joint complaint in the left knee and thumb, which seem to be resolved with Diet change. Current work show show joint changes consistent with OA and and also possible inflammatory arthritis given joint changes in symmetric joints including in the wrist. Also noted to have elevated uric acid which suggested underlying gout. MRI show tricompartment arthritis and ligament derangement.   -Continue Celebrex 200 mg daily as needed can try to take it every other day, discuss PPI therapy for GI prophylaxis  -Continue Allopurinol 300 mg daily  -Patient consented and tolerated left knee injection with 40 mg of Kenalog  -Check routine labs  -F/u in 6 months

## 2024-06-07 DIAGNOSIS — E55.9 VITAMIN D DEFICIENCY: ICD-10-CM

## 2024-06-10 ENCOUNTER — APPOINTMENT (OUTPATIENT)
Dept: SLEEP MEDICINE | Facility: CLINIC | Age: 54
End: 2024-06-10
Payer: COMMERCIAL

## 2024-06-11 RX ORDER — ERGOCALCIFEROL 1.25 MG/1
50000 CAPSULE ORAL
Qty: 4 CAPSULE | Refills: 0 | Status: SHIPPED | OUTPATIENT
Start: 2024-06-16 | End: 2024-07-08

## 2024-06-21 ENCOUNTER — APPOINTMENT (OUTPATIENT)
Dept: PRIMARY CARE | Facility: CLINIC | Age: 54
End: 2024-06-21
Payer: COMMERCIAL

## 2024-06-21 VITALS
OXYGEN SATURATION: 96 % | WEIGHT: 225.6 LBS | DIASTOLIC BLOOD PRESSURE: 60 MMHG | SYSTOLIC BLOOD PRESSURE: 112 MMHG | HEART RATE: 108 BPM | BODY MASS INDEX: 28.97 KG/M2

## 2024-06-21 DIAGNOSIS — Z13.0 SCREENING FOR DEFICIENCY ANEMIA: ICD-10-CM

## 2024-06-21 DIAGNOSIS — N28.9 ABNORMAL RENAL FUNCTION: ICD-10-CM

## 2024-06-21 DIAGNOSIS — Z13.6 ENCOUNTER FOR SCREENING FOR CARDIOVASCULAR DISORDERS: ICD-10-CM

## 2024-06-21 DIAGNOSIS — Z13.29 SCREENING FOR THYROID DISORDER: ICD-10-CM

## 2024-06-21 DIAGNOSIS — R73.09 ABNORMAL GLUCOSE: ICD-10-CM

## 2024-06-21 DIAGNOSIS — M10.9 GOUT, UNSPECIFIED CAUSE, UNSPECIFIED CHRONICITY, UNSPECIFIED SITE: Primary | ICD-10-CM

## 2024-06-21 PROBLEM — M25.469 EFFUSION OF KNEE: Status: ACTIVE | Noted: 2020-07-09

## 2024-06-21 PROBLEM — J02.9 SORE THROAT: Status: ACTIVE | Noted: 2024-06-21

## 2024-06-21 PROBLEM — R07.9 CHEST PAIN: Status: ACTIVE | Noted: 2023-05-02

## 2024-06-21 PROBLEM — Z86.39 HISTORY OF HYPERCHOLESTEROLEMIA: Status: ACTIVE | Noted: 2024-06-21

## 2024-06-21 PROCEDURE — 3078F DIAST BP <80 MM HG: CPT | Performed by: STUDENT IN AN ORGANIZED HEALTH CARE EDUCATION/TRAINING PROGRAM

## 2024-06-21 PROCEDURE — 3074F SYST BP LT 130 MM HG: CPT | Performed by: STUDENT IN AN ORGANIZED HEALTH CARE EDUCATION/TRAINING PROGRAM

## 2024-06-21 PROCEDURE — 99213 OFFICE O/P EST LOW 20 MIN: CPT | Performed by: STUDENT IN AN ORGANIZED HEALTH CARE EDUCATION/TRAINING PROGRAM

## 2024-06-21 ASSESSMENT — ENCOUNTER SYMPTOMS: DEPRESSION: 0

## 2024-06-21 ASSESSMENT — PAIN SCALES - GENERAL: PAINLEVEL: 0-NO PAIN

## 2024-06-21 NOTE — PROGRESS NOTES
Subjective   Patient ID: Ryan Moore is a 53 y.o. male who presents for Follow-up (Follow up for recent labs.).    HPI comes in after some abnormal labs with rheumatology    Review of Systems  Constitutional: NO F, chills, or sweats  Eyes: no blurred vision or visual disturbance  ENT: no hearing loss, no congestion, no nasal discharge, no hoarseness and no sore throat.   Cardiovascular: no chest pain, no edema, no palps and no syncope.   Respiratory: no cough,no s.o.b. and no wheezing  Gastrointestinal: no abdominal pain, No C/D no N/V, no blood in stools  Genitourinary: no dysuria, no change in urinary frequency, no urinary hesitancy and no feelings of urinary urgency.   Musculoskeletal: Gout versus psoriatic arthritis, his joint pains are improved the last several weeks after really adjusting his diet to low inflammatory diet  Objective   /60 (BP Location: Left arm, Patient Position: Sitting, BP Cuff Size: Large adult)   Pulse 108   Wt 102 kg (225 lb 9.6 oz)   SpO2 96%   BMI 28.97 kg/m²     Physical Exam  gen- a & o x 3, nad, pleasant    Assessment/Plan     1.  Follows up after seeing rheumatology.  Abnormal renal function and abnormal glucose however he was not fasting.  Also up until that lab work he was taking a lot of NSAIDs.  He has discontinued NSAIDs the past 2 to 3 weeks.  Has gone to a low inflammatory diet and his arthritis issues have improved.  Will repeat labs today

## 2024-06-21 NOTE — PATIENT INSTRUCTIONS
1.  Follows up after seeing rheumatology.  Abnormal renal function and abnormal glucose however he was not fasting.  Also up until that lab work he was taking a lot of NSAIDs.  He has discontinued NSAIDs the past 2 to 3 weeks.  Has gone to a low inflammatory diet and his arthritis issues have improved.  Will repeat labs today

## 2024-06-24 ENCOUNTER — OFFICE VISIT (OUTPATIENT)
Dept: SLEEP MEDICINE | Facility: CLINIC | Age: 54
End: 2024-06-24
Payer: COMMERCIAL

## 2024-06-24 VITALS
SYSTOLIC BLOOD PRESSURE: 156 MMHG | HEART RATE: 110 BPM | OXYGEN SATURATION: 95 % | BODY MASS INDEX: 28.97 KG/M2 | DIASTOLIC BLOOD PRESSURE: 91 MMHG | HEIGHT: 74 IN

## 2024-06-24 DIAGNOSIS — I10 PRIMARY HYPERTENSION: Primary | ICD-10-CM

## 2024-06-24 DIAGNOSIS — G47.33 OBSTRUCTIVE SLEEP APNEA SYNDROME: ICD-10-CM

## 2024-06-24 PROCEDURE — 99214 OFFICE O/P EST MOD 30 MIN: CPT | Performed by: INTERNAL MEDICINE

## 2024-06-24 PROCEDURE — 3077F SYST BP >= 140 MM HG: CPT | Performed by: INTERNAL MEDICINE

## 2024-06-24 PROCEDURE — 3080F DIAST BP >= 90 MM HG: CPT | Performed by: INTERNAL MEDICINE

## 2024-06-24 PROCEDURE — 1036F TOBACCO NON-USER: CPT | Performed by: INTERNAL MEDICINE

## 2024-06-24 ASSESSMENT — ENCOUNTER SYMPTOMS
LOSS OF SENSATION IN FEET: 0
DEPRESSION: 0
OCCASIONAL FEELINGS OF UNSTEADINESS: 0

## 2024-06-24 ASSESSMENT — PAIN SCALES - GENERAL: PAINLEVEL: 0-NO PAIN

## 2024-06-24 NOTE — PATIENT INSTRUCTIONS
"     NAME: Ryan Moore   DATE:  6/24/2024     Your Sleep Physician Today: Skye Hickey MD  Your Primary Care Physician: Christiano Beckett, DO      Thank you for coming to the Sleep Medicine Clinic today!  Below is a summary of your treatment plan, other important information, and our contact numbers:    TREATMENT PLAN     Trial with flonase: one spray nightly. Will not see effects for 7 days or more, but keep using until you feel nasal congestion is better  Then trial with the nasal mask sample (to see if this would be an option for you)  Script for RT to review the travel machine is in place for you.       Follow-up Appointment:    1 year    OUR ADULT SLEEP MEDICINE TEAM- Getting in touch   Please do not hesitate to call the office or sleep nurse with any questions between appointments. BEST way to do this are via ways below:    Me and my office team, quick contact:  Call [phone] and leave a message. One of the administrative assistants will forward the message to my/sleep nurse through \"My Chart\" and/or email.   Have a clinical question?  Adult sleep nurse: Chanelle Osman -[phone].    Call for clinical questions, medication updates, and concerns about prescriptions.   Email seep diaries and other documents at: [email or web address]  Or, send a message directly through \"My Chart\", which is the email service through your  Records Account: [email or web address].  This does not go directly to me but through another before it gets to me. So only use this if you have time to wait for a response.  If your issue is urgent, please call.   Have an appointment question?  Have an appointment concern, form faxed or other office issues?  Adult sleep : Sarah Arthur P: [phone]  F: [phone];         IMPORTANT PHONE NUMBERS     Appointments (for Adult Sleep Clinic- general line): [phone] - option 2  Appointments (For Sleep Studies- general line): " "[phone] - option 3  Sleep Surgery: [phone]  ENT (Otolaryngology): [phone]  [employer] ([email or web address]): [phone]    CONTACTING YOUR SLEEP MEDICINE DOCTOR- Getting in touch     Send a message directly to your provider through \"My Chart\", which is the email service through your  Records Account: [email or web address]   Call [phone] and leave a message. One of the administrative assistants will forward the message to your sleep medicine provider through \"My Chart\" and/or email.   Sleep nurse: For clinical questions, medication updates and refilling prescriptions: [phone]; or by email: [email or web address]    Please do not hesitate to reach out if you have pending questions.    Skye Hickey MD   "

## 2024-06-24 NOTE — PROGRESS NOTES
"     Patient: Ryan Moore   Patient info: [id number]  : 1970 -- AGE 53 y.o.    Clinician: Skye Hickey MD   Location [facility]   Service Date: 2024          [facility]  Follow-up Visit Note      HISTORY OF PRESENT ILLNESS     Ryan Moore is a 53 y.o. male who presents to a [facility] for followup.     The patient  has a past medical history of Arthropathic psoriasis, unspecified (Multi) (2013), Arthropathic psoriasis, unspecified (Multi) (2018), Coronary artery disease, Effusion, left knee (2018), Encounter for health counseling related to travel (2019), Encounter for screening for malignant neoplasm of rectum, Hypertension, Other long term (current) drug therapy, Overweight (2019), Personal history of diseases of the skin and subcutaneous tissue, Personal history of other diseases of the respiratory system (2017), Personal history of other endocrine, nutritional and metabolic disease (2015), Personal history of other specified conditions (2021), and Sciatica.    PAST SLEEP HISTORY   Last seen: 2023  Summary of last visit: 52 year male  ([place]) with history of CAD, HTN, Gout, here for followup of ULISES, symptomatic with long standing snoring, newer apnea, un-refreshing quality of sleep and newer daytime sleepiness, responsive to PAP thearpy with excellent complaints and multiple benefits (more energy, less snoring, no more sore throat etc). BP today ok, after rechecking and is asymptomatic.. Has been stable from a cardiology standpoint.     Diagnostics:   Severe ULISES by HSAT performed 2022: EDWARD 44 (35 if using 4% criteria)  PAP download raw data manually reviewed and also visually reviewed raw data from day to day. Has intermittent elevations of AHI to 5- overall \"average\" AHI of 2.2 is misleading. may need more pressure.      Education: FFM vs. " nasal, and pressure increases with FFM > nasal. but has allergies and concerned about nasal     Management:  AutoPAP 5-15 cmH20 --> change to [id number] today--> will send script   DME [facility]-- not happy with them lately. Used to get auto shipments and has not received a mask in several months  - reached out to [facility] Ludy Pritchard for today who communicated with supply team to get him supplies  mask is FFM      Gave nasal mask sample to try: Jorden F&P, LOT [id number]  he knows about typical allergy targets though has a cat at home so congestion.      RTC in 6 months to one year, due to mask issues, will see in 6 months or sooner if needed.        INTERIM DATA REVIEW:  Personally reviewed any raw data such as interpretation report, data sheet, hypnogram, and titration table if available and applicable  Notes and encounters in interim    CURRENT HISTORY/CONCERNS  SCALES:   ESS:  4/24  RAF 2 /28  FOSQ 40 /40    Last visit: Gave nasal mask sample to try: Jorden F&P, LOT [id number] (nasal instead of FFM to try) and re-connected with [facility] to get supplies better.  - did not try the nasal one, afraid of congestion    On today's visit, the patient reports doing well, and no major concerns for sleep today.       ULISES: On PAP therapy Resmed 9-22-22  Mask:       [employer]itera med  Prefers to keep his current FFM due to congestion but willing to try the sample mask in future.     Download of data visually reviewed  May 24th is an outlier- traveling and was very tired.  Highest AHI and pressure max, otherwise controlled   We reviewed anything different- travel only  Was particularly tired.     Travel machine_ blowing out too much pressure sometimes then other times not enough- this is while using. Has since stopped using because he cannot use it.  Given by [facility]    Benefits with PAP: same   PAP Adherence DATA:    Compliance Report Usage 05/21/2024 - 06/19/2024  Usage days 26/30 days (87%)  >= 4 hours 25 days (83%)  < 4 hours 1 days (3%)  Usage hours  194 hours 30 minutes  Average usage (total days) 6 hours 29 minutes  Average usage (days used) 7 hours 29 minutes  Median usage (days used) 7 hours 42 minutes  Total used hours (value since last reset - 06/19/2024) 4,677 hours  AirSense 11 AutoSet  Serial number [id number]  Mode AutoSet  Min Pressure 5 cmH2O  Max Pressure 16 cmH2O  EPR Fulltime  EPR level 3  Response Standard  Therapy  Pressure - cmH2O Median: 8.3 95th percentile: 12.8 Maximum: 14.6  Leaks - L/min Median: 0.4 95th percentile: 14.4 Maximum: 27.4  Events per hour AI: 1.3 HI: 0.8 AHI: 2.1  Apnea Index Central: 0.3 Obstructive: 0.9 Unknown: 0.0  RERA Index 0.3  Cheyne-Fishman respiration (average duration per night) 0 minutes (0%)       Daytime Symptoms  Doing well  Functioning ok  No sleepiness  No concerns      Sleep-Wake Schedule:   Bedtime: 10:30pm asleep by 10:45pm and wake time is 7am  This is all nights  No naps.   Side sleeping   Awakenings: 1-2x nocturia   WEEKDAYS/SCHOOL/WORK WEEKENDS/VACATIONS   Total noctural sleep duration  8 hours 8 hours       REVIEW OF SYSTEMS   Review of Systems  Congestion as above  Denies any problems with higher BP reading today     ALLERGIES AND MEDICATIONS   No Known Allergies    MEDICATIONS:     Current Outpatient Medications:     allopurinol (Zyloprim) 300 mg tablet, Take 1 tablet (300 mg) by mouth once daily., Disp: 90 tablet, Rfl: 1    aspirin 81 mg EC tablet, Take 1 tablet (81 mg) by mouth once daily., Disp: , Rfl:     atorvastatin (Lipitor) 80 mg tablet, Take 1 tablet (80 mg) by mouth once daily., Disp: 90 tablet, Rfl: 3    celecoxib (CeleBREX) 200 mg capsule, Take 1 capsule (200 mg) by mouth once daily. (Patient not taking: Reported on 6/21/2024), Disp: 90 capsule, Rfl: 1    cetirizine (ZyrTEC) 2.5 MG split tablet, Take by mouth., Disp: , Rfl:     ergocalciferol (Vitamin D-2) 1.25 MG (15302 UT) capsule, Take 1 capsule (50,000 Units) by mouth 1 (one) time per week for 4 doses., Disp: 4 capsule, Rfl: 0     lisinopril 20 mg tablet, Take 1 tablet (20 mg) by mouth once daily., Disp: , Rfl:     sildenafil (Revatio) 20 mg tablet, TAKE 2 TO 5 TABLETS BY MOUTH ONCE DAILY AS NEEDED FOR SEXUALY ACTIVITY, Disp: 50 tablet, Rfl: 11      HISTORIES   PAST MEDICAL HISTORY : He  has a past medical history of Arthropathic psoriasis, unspecified (Multi) (11/24/2013), Arthropathic psoriasis, unspecified (Multi) (02/21/2018), Coronary artery disease, Effusion, left knee (08/22/2018), Encounter for health counseling related to travel (05/14/2019), Encounter for screening for malignant neoplasm of rectum, Hypertension, Other long term (current) drug therapy, Overweight (12/11/2019), Personal history of diseases of the skin and subcutaneous tissue, Personal history of other diseases of the respiratory system (05/31/2017), Personal history of other endocrine, nutritional and metabolic disease (06/23/2015), Personal history of other specified conditions (03/22/2021), and Sciatica.  Patient Active Problem List   Diagnosis    Erectile dysfunction    Arthropathy    CAD (coronary artery disease)    Chest pain    Dysphagia    Familial heart disease    HTN (hypertension)    Gout    Habitual snoring    Hiatal hernia    Hyperlipidemia    Obstructive sleep apnea syndrome    Osteoarthritis of knee    Psoriatic arthropathy (Multi)    Swallowing problem    Vitamin D deficiency    Abnormal computed tomography scan    Effusion of knee    Elevated blood pressure reading without diagnosis of hypertension    Lumbar radiculopathy    Obesity    Overweight    Hip weakness    Arteriosclerosis of coronary artery    Atypical chest pain    Heart disease    Psoriasis with arthropathy (Multi)    Tenosynovitis of foot    Squamous cell carcinoma of skin of other parts of face    Neoplasm of uncertain behavior of skin    Tenosynovitis of ankle    Lumbar disc herniation    History of hypercholesterolemia    Sore throat     PAST SURGICAL HISTORY: He  has a past surgical  "history that includes Refractive surgery (06/10/2019); Appendectomy (06/10/2019); Knee arthroscopy w/ debridement (06/10/2019); and Anterior cruciate ligament repair.   FAMILY HISTORY: No changes since previous visit. Otherwise non-contributory as charted.     SOCIAL HISTORY  Patient:  reports that he has never smoked. He has never used smokeless tobacco. He reports current alcohol use of about 10.0 standard drinks of alcohol per week. He reports that he does not use drugs.     PHYSICAL EXAM     VITAL SIGNS: BP (!) 156/91 (BP Location: Right arm, Patient Position: Sitting)   Pulse 110   Ht 1.88 m (6' 2\")   SpO2 95%   BMI 28.97 kg/m²   (BP readings from home are shown and good, asymptomatic.)  PREVIOUS WEIGHTS:  Wt Readings from Last 3 Encounters:   06/21/24 102 kg (225 lb 9.6 oz)   06/05/24 105 kg (232 lb)   12/06/23 106 kg (233 lb 11.2 oz)     EXAM:  General: Alert, attentive, in NAD  HEENT: Nares patent and narrow; right sided turbinate hypertrophy, oropharynx is Mallampati class 3-4 uvula: midline nonedematous,    +retrognathia   Dentition/Jaw: appropriate dentition;    Neck: no visible masses  Lungs: Clear no cough  Extremities: warm, well perfused, no visible edema, normal joints,   Skin: no visible rash   Neurologic: face symmetric   Psychiatric: Affect appropriate      OTHER RESULTS/DATA     Lab Review  CBC:   Lab Results   Component Value Date    WBC 10.0 06/05/2024    HGB 14.9 06/05/2024    HCT 44.1 06/05/2024    MCV 95 06/05/2024     06/05/2024    TSH:   Lab Results   Component Value Date    TSH 2.21 05/02/2023   ; BMP:   Lab Results   Component Value Date    GLUCOSE 127 (H) 06/05/2024    CALCIUM 9.8 06/05/2024     06/05/2024    K 4.2 06/05/2024    CO2 24 06/05/2024     06/05/2024    BUN 30 (H) 06/05/2024    CREATININE 1.88 (H) 06/05/2024        ASSESSMENT/PLAN   Mr. Moore is a 53 y.o. male  returning to the  Sleep Medicine Clinic for follow-up of ULISES, severe, responsive to " PAP thearpy and on last visit we made slight adjustments to the pressure. Detailed review of the download raw data today shows acceptable AHI and other indices. One night outlier- otherwise current pressure settings (which were changed from last visit) look good.     Travel machine is malfunctioning and needs to be examined.   BP today is elevated (asymptomatic) but BP readings at home acceptable for the most part. Continues to track and will see cardiology with these values.     Problem List and Plan/Orders  Problem List Items Addressed This Visit       HTN (hypertension) - Primary    Obstructive sleep apnea syndrome    Overview     Symptomatic with long standing snoring, newer apnea, un-refreshing quality of sleep and newer daytime sleepiness  Diagnostics: Severe ULISES by HSAT June 16, 2022: EDWARD 44 (35 if using 4% criteria)  Tx: responsive to PAP thearpy with excellent usage and multiple benefits (more energy, less snoring, no more sore throat etc).           Relevant Orders    Positive Airway Pressure (PAP) Therapy     CGI:  2-much improved     Recommendations/Plan/Management:  Continue AutoPAP [id number]  Continue supplies  DME: [facility]  Travel machine: not working properly, cannot use and likely malfunctioning.  Reached out to DME: [facility]--> they can take a look at this machine   Start flonase  Then trial with nasal mask (given at prior visit)  Adequate sleep duration and appropriate timing as he is doing.   Tracking BP--> seeing cardiology       Followup: 12 months, provided contacts for interim care if needed.    Skye Hickey MD

## 2024-06-27 ENCOUNTER — LAB (OUTPATIENT)
Dept: LAB | Facility: LAB | Age: 54
End: 2024-06-27
Payer: COMMERCIAL

## 2024-06-27 DIAGNOSIS — R73.09 ABNORMAL GLUCOSE: ICD-10-CM

## 2024-06-27 DIAGNOSIS — Z13.6 ENCOUNTER FOR SCREENING FOR CARDIOVASCULAR DISORDERS: ICD-10-CM

## 2024-06-27 DIAGNOSIS — M10.9 GOUT, UNSPECIFIED CAUSE, UNSPECIFIED CHRONICITY, UNSPECIFIED SITE: ICD-10-CM

## 2024-06-27 DIAGNOSIS — Z13.0 SCREENING FOR DEFICIENCY ANEMIA: ICD-10-CM

## 2024-06-27 DIAGNOSIS — N28.9 ABNORMAL RENAL FUNCTION: ICD-10-CM

## 2024-06-27 DIAGNOSIS — Z13.29 SCREENING FOR THYROID DISORDER: ICD-10-CM

## 2024-06-27 LAB
ALBUMIN SERPL BCP-MCNC: 4.3 G/DL (ref 3.4–5)
ALP SERPL-CCNC: 74 U/L (ref 33–120)
ALT SERPL W P-5'-P-CCNC: 21 U/L (ref 10–52)
ANION GAP SERPL CALC-SCNC: 15 MMOL/L (ref 10–20)
APPEARANCE UR: CLEAR
AST SERPL W P-5'-P-CCNC: 22 U/L (ref 9–39)
BASOPHILS # BLD AUTO: 0.03 X10*3/UL (ref 0–0.1)
BASOPHILS NFR BLD AUTO: 0.5 %
BILIRUB SERPL-MCNC: 0.6 MG/DL (ref 0–1.2)
BILIRUB UR STRIP.AUTO-MCNC: NEGATIVE MG/DL
BUN SERPL-MCNC: 30 MG/DL (ref 6–23)
CALCIUM SERPL-MCNC: 9.5 MG/DL (ref 8.6–10.6)
CHLORIDE SERPL-SCNC: 103 MMOL/L (ref 98–107)
CHOLEST SERPL-MCNC: 198 MG/DL (ref 0–199)
CHOLESTEROL/HDL RATIO: 2.8
CO2 SERPL-SCNC: 25 MMOL/L (ref 21–32)
COLOR UR: NORMAL
CREAT SERPL-MCNC: 1.6 MG/DL (ref 0.5–1.3)
CREAT UR-MCNC: 120 MG/DL (ref 20–370)
EGFRCR SERPLBLD CKD-EPI 2021: 51 ML/MIN/1.73M*2
EOSINOPHIL # BLD AUTO: 0.07 X10*3/UL (ref 0–0.7)
EOSINOPHIL NFR BLD AUTO: 1.1 %
ERYTHROCYTE [DISTWIDTH] IN BLOOD BY AUTOMATED COUNT: 12.3 % (ref 11.5–14.5)
EST. AVERAGE GLUCOSE BLD GHB EST-MCNC: 103 MG/DL
GLUCOSE SERPL-MCNC: 94 MG/DL (ref 74–99)
GLUCOSE UR STRIP.AUTO-MCNC: NORMAL MG/DL
HBA1C MFR BLD: 5.2 %
HCT VFR BLD AUTO: 43 % (ref 41–52)
HDLC SERPL-MCNC: 71 MG/DL
HGB BLD-MCNC: 14.4 G/DL (ref 13.5–17.5)
IMM GRANULOCYTES # BLD AUTO: 0.02 X10*3/UL (ref 0–0.7)
IMM GRANULOCYTES NFR BLD AUTO: 0.3 % (ref 0–0.9)
KETONES UR STRIP.AUTO-MCNC: NEGATIVE MG/DL
LDLC SERPL CALC-MCNC: 89 MG/DL
LEUKOCYTE ESTERASE UR QL STRIP.AUTO: NEGATIVE
LYMPHOCYTES # BLD AUTO: 1.13 X10*3/UL (ref 1.2–4.8)
LYMPHOCYTES NFR BLD AUTO: 17.6 %
MCH RBC QN AUTO: 32.4 PG (ref 26–34)
MCHC RBC AUTO-ENTMCNC: 33.5 G/DL (ref 32–36)
MCV RBC AUTO: 97 FL (ref 80–100)
MICROALBUMIN UR-MCNC: 27 MG/L
MICROALBUMIN/CREAT UR: 22.5 UG/MG CREAT
MONOCYTES # BLD AUTO: 0.62 X10*3/UL (ref 0.1–1)
MONOCYTES NFR BLD AUTO: 9.7 %
NEUTROPHILS # BLD AUTO: 4.55 X10*3/UL (ref 1.2–7.7)
NEUTROPHILS NFR BLD AUTO: 70.8 %
NITRITE UR QL STRIP.AUTO: NEGATIVE
NON HDL CHOLESTEROL: 127 MG/DL (ref 0–149)
NRBC BLD-RTO: 0 /100 WBCS (ref 0–0)
PH UR STRIP.AUTO: 6 [PH]
PLATELET # BLD AUTO: 195 X10*3/UL (ref 150–450)
POTASSIUM SERPL-SCNC: 4.5 MMOL/L (ref 3.5–5.3)
PROT SERPL-MCNC: 6.8 G/DL (ref 6.4–8.2)
PROT UR STRIP.AUTO-MCNC: NEGATIVE MG/DL
RBC # BLD AUTO: 4.45 X10*6/UL (ref 4.5–5.9)
RBC # UR STRIP.AUTO: NEGATIVE /UL
SODIUM SERPL-SCNC: 138 MMOL/L (ref 136–145)
SP GR UR STRIP.AUTO: 1.01
TRIGL SERPL-MCNC: 188 MG/DL (ref 0–149)
UROBILINOGEN UR STRIP.AUTO-MCNC: NORMAL MG/DL
VLDL: 38 MG/DL (ref 0–40)
WBC # BLD AUTO: 6.4 X10*3/UL (ref 4.4–11.3)

## 2024-06-27 PROCEDURE — 80061 LIPID PANEL: CPT

## 2024-06-27 PROCEDURE — 83036 HEMOGLOBIN GLYCOSYLATED A1C: CPT

## 2024-06-27 PROCEDURE — 80053 COMPREHEN METABOLIC PANEL: CPT

## 2024-06-27 PROCEDURE — 82570 ASSAY OF URINE CREATININE: CPT

## 2024-06-27 PROCEDURE — 36415 COLL VENOUS BLD VENIPUNCTURE: CPT

## 2024-06-27 PROCEDURE — 85025 COMPLETE CBC W/AUTO DIFF WBC: CPT

## 2024-06-27 PROCEDURE — 81003 URINALYSIS AUTO W/O SCOPE: CPT

## 2024-06-27 PROCEDURE — 82043 UR ALBUMIN QUANTITATIVE: CPT

## 2024-07-09 PROBLEM — R07.9 CHEST PAIN: Status: RESOLVED | Noted: 2023-05-02 | Resolved: 2024-07-09

## 2024-07-09 PROBLEM — N52.9 ERECTILE DYSFUNCTION: Status: RESOLVED | Noted: 2023-05-02 | Resolved: 2024-07-09

## 2024-07-09 PROBLEM — I25.10 ARTERIOSCLEROSIS OF CORONARY ARTERY: Chronic | Status: ACTIVE | Noted: 2020-07-09

## 2024-07-09 PROBLEM — R13.10 DYSPHAGIA: Status: RESOLVED | Noted: 2023-05-02 | Resolved: 2024-07-09

## 2024-07-09 PROBLEM — K44.9 HIATAL HERNIA: Status: RESOLVED | Noted: 2023-05-02 | Resolved: 2024-07-09

## 2024-07-09 PROBLEM — R13.10 SWALLOWING PROBLEM: Status: RESOLVED | Noted: 2020-07-09 | Resolved: 2024-07-09

## 2024-07-13 ENCOUNTER — APPOINTMENT (OUTPATIENT)
Dept: RADIOLOGY | Facility: HOSPITAL | Age: 54
End: 2024-07-13
Payer: COMMERCIAL

## 2024-07-13 ENCOUNTER — HOSPITAL ENCOUNTER (EMERGENCY)
Facility: HOSPITAL | Age: 54
Discharge: HOME | End: 2024-07-13
Attending: EMERGENCY MEDICINE
Payer: COMMERCIAL

## 2024-07-13 ENCOUNTER — APPOINTMENT (OUTPATIENT)
Dept: CARDIOLOGY | Facility: HOSPITAL | Age: 54
End: 2024-07-13
Payer: COMMERCIAL

## 2024-07-13 VITALS
DIASTOLIC BLOOD PRESSURE: 81 MMHG | SYSTOLIC BLOOD PRESSURE: 166 MMHG | RESPIRATION RATE: 18 BRPM | WEIGHT: 220 LBS | BODY MASS INDEX: 28.23 KG/M2 | OXYGEN SATURATION: 94 % | TEMPERATURE: 97.5 F | HEIGHT: 74 IN | HEART RATE: 100 BPM

## 2024-07-13 DIAGNOSIS — R07.89 ATYPICAL CHEST PAIN: Primary | ICD-10-CM

## 2024-07-13 LAB
ALBUMIN SERPL BCP-MCNC: 4.5 G/DL (ref 3.4–5)
ALP SERPL-CCNC: 83 U/L (ref 33–120)
ALT SERPL W P-5'-P-CCNC: 46 U/L (ref 10–52)
ANION GAP SERPL CALC-SCNC: 16 MMOL/L (ref 10–20)
AST SERPL W P-5'-P-CCNC: 40 U/L (ref 9–39)
BASOPHILS # BLD AUTO: 0.03 X10*3/UL (ref 0–0.1)
BASOPHILS NFR BLD AUTO: 0.4 %
BILIRUB SERPL-MCNC: 0.6 MG/DL (ref 0–1.2)
BUN SERPL-MCNC: 22 MG/DL (ref 6–23)
CALCIUM SERPL-MCNC: 9.9 MG/DL (ref 8.6–10.3)
CARDIAC TROPONIN I PNL SERPL HS: 18 NG/L (ref 0–20)
CARDIAC TROPONIN I PNL SERPL HS: 18 NG/L (ref 0–20)
CHLORIDE SERPL-SCNC: 100 MMOL/L (ref 98–107)
CO2 SERPL-SCNC: 24 MMOL/L (ref 21–32)
CREAT SERPL-MCNC: 1.4 MG/DL (ref 0.5–1.3)
EGFRCR SERPLBLD CKD-EPI 2021: 59 ML/MIN/1.73M*2
EOSINOPHIL # BLD AUTO: 0.03 X10*3/UL (ref 0–0.7)
EOSINOPHIL NFR BLD AUTO: 0.4 %
ERYTHROCYTE [DISTWIDTH] IN BLOOD BY AUTOMATED COUNT: 12.6 % (ref 11.5–14.5)
GLUCOSE SERPL-MCNC: 111 MG/DL (ref 74–99)
HCT VFR BLD AUTO: 42.9 % (ref 41–52)
HGB BLD-MCNC: 14.7 G/DL (ref 13.5–17.5)
IMM GRANULOCYTES # BLD AUTO: 0.02 X10*3/UL (ref 0–0.7)
IMM GRANULOCYTES NFR BLD AUTO: 0.3 % (ref 0–0.9)
INR PPP: 0.9 (ref 0.9–1.1)
LYMPHOCYTES # BLD AUTO: 0.71 X10*3/UL (ref 1.2–4.8)
LYMPHOCYTES NFR BLD AUTO: 10 %
MCH RBC QN AUTO: 32.8 PG (ref 26–34)
MCHC RBC AUTO-ENTMCNC: 34.3 G/DL (ref 32–36)
MCV RBC AUTO: 96 FL (ref 80–100)
MONOCYTES # BLD AUTO: 1 X10*3/UL (ref 0.1–1)
MONOCYTES NFR BLD AUTO: 14.1 %
NEUTROPHILS # BLD AUTO: 5.29 X10*3/UL (ref 1.2–7.7)
NEUTROPHILS NFR BLD AUTO: 74.8 %
NRBC BLD-RTO: 0 /100 WBCS (ref 0–0)
PLATELET # BLD AUTO: 120 X10*3/UL (ref 150–450)
POTASSIUM SERPL-SCNC: 3.8 MMOL/L (ref 3.5–5.3)
PROT SERPL-MCNC: 7.5 G/DL (ref 6.4–8.2)
PROTHROMBIN TIME: 10.6 SECONDS (ref 9.8–12.8)
RBC # BLD AUTO: 4.48 X10*6/UL (ref 4.5–5.9)
SODIUM SERPL-SCNC: 136 MMOL/L (ref 136–145)
WBC # BLD AUTO: 7.1 X10*3/UL (ref 4.4–11.3)

## 2024-07-13 PROCEDURE — 93005 ELECTROCARDIOGRAM TRACING: CPT

## 2024-07-13 PROCEDURE — 85610 PROTHROMBIN TIME: CPT | Performed by: EMERGENCY MEDICINE

## 2024-07-13 PROCEDURE — 84484 ASSAY OF TROPONIN QUANT: CPT | Performed by: EMERGENCY MEDICINE

## 2024-07-13 PROCEDURE — 80053 COMPREHEN METABOLIC PANEL: CPT | Performed by: EMERGENCY MEDICINE

## 2024-07-13 PROCEDURE — 71045 X-RAY EXAM CHEST 1 VIEW: CPT

## 2024-07-13 PROCEDURE — 36415 COLL VENOUS BLD VENIPUNCTURE: CPT | Performed by: EMERGENCY MEDICINE

## 2024-07-13 PROCEDURE — 71045 X-RAY EXAM CHEST 1 VIEW: CPT | Performed by: RADIOLOGY

## 2024-07-13 PROCEDURE — 99283 EMERGENCY DEPT VISIT LOW MDM: CPT | Mod: 25

## 2024-07-13 PROCEDURE — 85025 COMPLETE CBC W/AUTO DIFF WBC: CPT | Performed by: EMERGENCY MEDICINE

## 2024-07-13 RX ORDER — LISINOPRIL 20 MG/1
20 TABLET ORAL DAILY
Qty: 30 TABLET | Refills: 0 | Status: SHIPPED | OUTPATIENT
Start: 2024-07-13 | End: 2024-08-12

## 2024-07-13 ASSESSMENT — PAIN DESCRIPTION - PAIN TYPE: TYPE: ACUTE PAIN

## 2024-07-13 ASSESSMENT — PAIN DESCRIPTION - DESCRIPTORS: DESCRIPTORS: TIGHTNESS

## 2024-07-13 ASSESSMENT — COLUMBIA-SUICIDE SEVERITY RATING SCALE - C-SSRS
6. HAVE YOU EVER DONE ANYTHING, STARTED TO DO ANYTHING, OR PREPARED TO DO ANYTHING TO END YOUR LIFE?: NO
2. HAVE YOU ACTUALLY HAD ANY THOUGHTS OF KILLING YOURSELF?: NO
1. IN THE PAST MONTH, HAVE YOU WISHED YOU WERE DEAD OR WISHED YOU COULD GO TO SLEEP AND NOT WAKE UP?: NO

## 2024-07-13 ASSESSMENT — PAIN DESCRIPTION - LOCATION: LOCATION: CHEST

## 2024-07-13 ASSESSMENT — PAIN - FUNCTIONAL ASSESSMENT: PAIN_FUNCTIONAL_ASSESSMENT: 0-10

## 2024-07-13 ASSESSMENT — PAIN SCALES - GENERAL: PAINLEVEL_OUTOF10: 5 - MODERATE PAIN

## 2024-07-13 NOTE — ED TRIAGE NOTES
PT.  AWOKE AROUND  4 AM WITH CHEST DISCOMFORT AND SOB. PT. STATES USES CPAP, FELT LIKE HIS BREATHING WAS RAPID. DENIES DIZZINESS, NAUSEA. PT.  HAS NOT TAKEN HIS LISINOPRIL FOR PAST THREE DAYS BECAUSE HAVING TROUBLE GETTING REFILL.

## 2024-07-13 NOTE — ED PROVIDER NOTES
HPI   Chief Complaint   Patient presents with    Chest Pain     PT.  AWOKE AROUND  4 AM WITH CHEST DISCOMFORT AND SOB. PT.  USES CPAP, FELT LIKE HIS BREATHING WAS RAPID. DENIES DIZZINESS, NAUSEA. PT.  HAS NOT TAKEN HIS LISINOPRIL FOR PAST THREE DAYS BECAUSE HAVING TROUBLE GETTING REFILL.       53-year-old male who presents with chest discomfort.  Patient states around 4 AM he awoke was having chest tightness.  He has a history of high blood pressure and high cholesterol.  He is not a smoker.  Patient states has been intermittent throughout the morning.  He felt short of breath he does wear a BiPAP at night for sleep apnea and felt like he was short of breath with that on.  He denies any fevers chills or cough.  Denies any nausea or vomiting or abdominal pain.                          Hollister Coma Scale Score: 15                     Patient History   Past Medical History:   Diagnosis Date    Arteriosclerosis of coronary artery 07/09/2020    Elevated 581 Agatston units (04/12/2019)    Arthropathic psoriasis, unspecified (Multi) 11/24/2013    Psoriatic arthropathy    Arthropathic psoriasis, unspecified (Multi) 02/21/2018    Psoriasis with arthropathy    Coronary artery disease     Effusion, left knee 08/22/2018    Effusion of left knee    Encounter for health counseling related to travel 05/14/2019    Counseling for travel    Encounter for screening for malignant neoplasm of rectum     Screening for malignant neoplasm of the rectum    Hyperlipidemia 03/21/2012    PCP follows    Hypertension     Other long term (current) drug therapy     High risk medication use    Overweight 12/11/2019    Overweight    Personal history of diseases of the skin and subcutaneous tissue     History of psoriasis    Personal history of other diseases of the respiratory system 05/31/2017    History of sore throat    Personal history of other endocrine, nutritional and metabolic disease 06/23/2015    History of  hypercholesterolemia    Personal history of other specified conditions 03/22/2021    History of chest pain    Sciatica      Past Surgical History:   Procedure Laterality Date    ANTERIOR CRUCIATE LIGAMENT REPAIR      APPENDECTOMY  06/10/2019    Appendectomy    KNEE ARTHROSCOPY W/ DEBRIDEMENT  06/10/2019    Arthroscopy Knee Left    REFRACTIVE SURGERY  06/10/2019    Corneal LASIK     Family History   Problem Relation Name Age of Onset    Depression Mother Kathe     Arthritis Mother Kathe     Other (CARDIAC DISORDER) Mother Kathe     Osteoarthritis Mother Kathe     Osteoporosis Mother Kathe     Cancer Mother Kathe     Cancer Father      Other (Other) Father      Coronary artery disease Father      Stroke Sister Mayelin     Asthma Sister Mayelin     Rheum arthritis Paternal Grandmother      Stroke Sister Melly      Social History     Tobacco Use    Smoking status: Never    Smokeless tobacco: Never   Substance Use Topics    Alcohol use: Yes     Alcohol/week: 10.0 standard drinks of alcohol     Types: 10 Shots of liquor per week    Drug use: Never       Physical Exam   ED Triage Vitals [07/13/24 0726]   Temperature Heart Rate Respirations BP   36.4 °C (97.5 °F) 94 16 (!) 173/92      Pulse Ox Temp Source Heart Rate Source Patient Position   98 % Temporal Monitor Sitting      BP Location FiO2 (%)     Right arm --       Physical Exam  Constitutional:       Appearance: Normal appearance. He is normal weight.   HENT:      Head: Normocephalic and atraumatic.      Nose: Nose normal.      Mouth/Throat:      Mouth: Mucous membranes are moist.      Pharynx: Oropharynx is clear.   Eyes:      Extraocular Movements: Extraocular movements intact.      Conjunctiva/sclera: Conjunctivae normal.      Pupils: Pupils are equal, round, and reactive to light.   Cardiovascular:      Rate and Rhythm: Normal rate and regular rhythm.   Pulmonary:      Effort: Pulmonary effort is normal.      Breath sounds: Normal breath sounds.   Abdominal:       General: Abdomen is flat. Bowel sounds are normal.      Palpations: Abdomen is soft.   Musculoskeletal:         General: Normal range of motion.      Cervical back: Normal range of motion and neck supple.   Skin:     General: Skin is warm and dry.      Capillary Refill: Capillary refill takes less than 2 seconds.   Neurological:      General: No focal deficit present.      Mental Status: He is alert.   Psychiatric:         Mood and Affect: Mood normal.         Behavior: Behavior normal.         Thought Content: Thought content normal.         Judgment: Judgment normal.       Labs Reviewed   CBC WITH AUTO DIFFERENTIAL - Abnormal       Result Value    WBC 7.1      nRBC 0.0      RBC 4.48 (*)     Hemoglobin 14.7      Hematocrit 42.9      MCV 96      MCH 32.8      MCHC 34.3      RDW 12.6      Platelets 120 (*)     Neutrophils % 74.8      Immature Granulocytes %, Automated 0.3      Lymphocytes % 10.0      Monocytes % 14.1      Eosinophils % 0.4      Basophils % 0.4      Neutrophils Absolute 5.29      Immature Granulocytes Absolute, Automated 0.02      Lymphocytes Absolute 0.71 (*)     Monocytes Absolute 1.00      Eosinophils Absolute 0.03      Basophils Absolute 0.03     COMPREHENSIVE METABOLIC PANEL - Abnormal    Glucose 111 (*)     Sodium 136      Potassium 3.8      Chloride 100      Bicarbonate 24      Anion Gap 16      Urea Nitrogen 22      Creatinine 1.40 (*)     eGFR 59 (*)     Calcium 9.9      Albumin 4.5      Alkaline Phosphatase 83      Total Protein 7.5      AST 40 (*)     Bilirubin, Total 0.6      ALT 46     PROTIME-INR - Normal    Protime 10.6      INR 0.9     SERIAL TROPONIN-INITIAL - Normal    Troponin I, High Sensitivity 18      Narrative:     Less than 99th percentile of normal range cutoff-  Female and children under 18 years old <14 ng/L; Male <21 ng/L: Negative  Repeat testing should be performed if clinically indicated.     Female and children under 18 years old 14-50 ng/L; Male 21-50 ng/L:  Consistent  with possible cardiac damage and possible increased clinical   risk. Serial measurements may help to assess extent of myocardial damage.     >50 ng/L: Consistent with cardiac damage, increased clinical risk and  myocardial infarction. Serial measurements may help assess extent of   myocardial damage.      NOTE: Children less than 1 year old may have higher baseline troponin   levels and results should be interpreted in conjunction with the overall   clinical context.     NOTE: Troponin I testing is performed using a different   testing methodology at [facility] than at other   [facility]. Direct result comparisons should only   be made within the same method.   SERIAL TROPONIN, 1 HOUR - Normal    Troponin I, High Sensitivity 18      Narrative:     Less than 99th percentile of normal range cutoff-  Female and children under 18 years old <14 ng/L; Male <21 ng/L: Negative  Repeat testing should be performed if clinically indicated.     Female and children under 18 years old 14-50 ng/L; Male 21-50 ng/L:  Consistent with possible cardiac damage and possible increased clinical   risk. Serial measurements may help to assess extent of myocardial damage.     >50 ng/L: Consistent with cardiac damage, increased clinical risk and  myocardial infarction. Serial measurements may help assess extent of   myocardial damage.      NOTE: Children less than 1 year old may have higher baseline troponin   levels and results should be interpreted in conjunction with the overall   clinical context.     NOTE: Troponin I testing is performed using a different   testing methodology at [facility] than at other   [facility]. Direct result comparisons should only   be made within the same method.   TROPONIN SERIES- (INITIAL, 1 HR)    Narrative:     The following orders were created for panel order Troponin I Series, High Sensitivity (0, 1 HR).  Procedure                               Abnormality          Status                     ---------                               -----------         ------                     Troponin I, High Sensiti...[[id number]]  Normal              Final result               Troponin, High Sensitivi...[[id number]]  Normal              Final result                 Please view results for these tests on the individual orders.       XR chest 1 view   Final Result   No radiographic evidence of an acute cardiopulmonary process.        MACRO:   None.        Signed by: Zane De Leon 7/13/2024 9:13 AM   Dictation workstation:   [id number]            ED Course & MDM   Diagnoses as of 07/13/24 0953   Atypical chest pain       Medical Decision Making  Emergency department course, EKG was obtained showed a sinus tachycardia 102 with nonspecific ST-T wave changes.  2 high-sensitivity troponins were obtained which were 18 and 18.  Laboratory studies otherwise were unremarkable.  He does have a platelet count of 120 with a creatinine of 1.4.  Chest x-ray shows no acute infiltrate or effusion.  I feel comfortable discharging the patient home.  Patient does have a follow-up appointment with his cardiologist.  He did run out of his lisinopril 20 mg daily.  I will write him a prescription for this.  He is instructed to return for increased pain shortness of breath or vomiting.    Amount and/or Complexity of Data Reviewed  ECG/medicine tests: independent interpretation performed.     Details: EKG shows a sinus tachycardia at a rate of 102 with nonspecific ST-T wave changes, interpreted by ED physician.        Procedure  Procedures     Sandra Caal,   07/13/24 0953

## 2024-07-28 PROBLEM — M65.979 TENOSYNOVITIS OF ANKLE: Status: RESOLVED | Noted: 2023-12-05 | Resolved: 2024-07-28

## 2024-07-28 PROBLEM — J02.9 SORE THROAT: Status: RESOLVED | Noted: 2024-06-21 | Resolved: 2024-07-28

## 2024-07-28 PROBLEM — G47.33 OBSTRUCTIVE SLEEP APNEA SYNDROME: Chronic | Status: ACTIVE | Noted: 2023-05-02

## 2024-07-28 PROBLEM — M65.9 TENOSYNOVITIS OF ANKLE: Status: RESOLVED | Noted: 2023-12-05 | Resolved: 2024-07-28

## 2024-07-28 PROBLEM — L40.50 PSORIASIS WITH ARTHROPATHY (MULTI): Status: RESOLVED | Noted: 2020-07-09 | Resolved: 2024-07-28

## 2024-07-28 PROBLEM — M25.469 EFFUSION OF KNEE: Status: RESOLVED | Noted: 2020-07-09 | Resolved: 2024-07-28

## 2024-07-28 PROBLEM — M12.9 ARTHROPATHY: Status: RESOLVED | Noted: 2023-05-02 | Resolved: 2024-07-28

## 2024-07-28 PROBLEM — M17.9 OSTEOARTHRITIS OF KNEE: Status: RESOLVED | Noted: 2023-05-02 | Resolved: 2024-07-28

## 2024-07-28 PROBLEM — Z09 HOSPITAL DISCHARGE FOLLOW-UP: Status: ACTIVE | Noted: 2024-07-28

## 2024-07-29 ENCOUNTER — OFFICE VISIT (OUTPATIENT)
Dept: CARDIOLOGY | Facility: CLINIC | Age: 54
End: 2024-07-29
Payer: COMMERCIAL

## 2024-07-29 VITALS
SYSTOLIC BLOOD PRESSURE: 152 MMHG | WEIGHT: 222 LBS | OXYGEN SATURATION: 95 % | HEART RATE: 104 BPM | BODY MASS INDEX: 28.5 KG/M2 | DIASTOLIC BLOOD PRESSURE: 86 MMHG

## 2024-07-29 DIAGNOSIS — I10 PRIMARY HYPERTENSION: ICD-10-CM

## 2024-07-29 DIAGNOSIS — R07.89 ATYPICAL CHEST PAIN: ICD-10-CM

## 2024-07-29 DIAGNOSIS — I25.10 ARTERIOSCLEROSIS OF CORONARY ARTERY: Primary | Chronic | ICD-10-CM

## 2024-07-29 DIAGNOSIS — Z09 HOSPITAL DISCHARGE FOLLOW-UP: ICD-10-CM

## 2024-07-29 DIAGNOSIS — E78.2 MIXED HYPERLIPIDEMIA: Chronic | ICD-10-CM

## 2024-07-29 PROCEDURE — 1036F TOBACCO NON-USER: CPT | Performed by: INTERNAL MEDICINE

## 2024-07-29 PROCEDURE — 99213 OFFICE O/P EST LOW 20 MIN: CPT | Performed by: INTERNAL MEDICINE

## 2024-07-29 PROCEDURE — 3077F SYST BP >= 140 MM HG: CPT | Performed by: INTERNAL MEDICINE

## 2024-07-29 PROCEDURE — 93010 ELECTROCARDIOGRAM REPORT: CPT | Performed by: STUDENT IN AN ORGANIZED HEALTH CARE EDUCATION/TRAINING PROGRAM

## 2024-07-29 PROCEDURE — 3079F DIAST BP 80-89 MM HG: CPT | Performed by: INTERNAL MEDICINE

## 2024-07-29 PROCEDURE — 93005 ELECTROCARDIOGRAM TRACING: CPT | Performed by: INTERNAL MEDICINE

## 2024-07-29 RX ORDER — EZETIMIBE 10 MG/1
10 TABLET ORAL DAILY
Qty: 90 TABLET | Refills: 3 | Status: SHIPPED | OUTPATIENT
Start: 2024-07-29 | End: 2025-07-29

## 2024-07-29 RX ORDER — LISINOPRIL 20 MG/1
20 TABLET ORAL DAILY
Qty: 30 TABLET | Refills: 0 | Status: SHIPPED | OUTPATIENT
Start: 2024-07-29 | End: 2024-07-29

## 2024-07-29 RX ORDER — LISINOPRIL 20 MG/1
20 TABLET ORAL DAILY
Qty: 90 TABLET | Refills: 3 | Status: SHIPPED | OUTPATIENT
Start: 2024-07-29 | End: 2025-07-29

## 2024-07-29 NOTE — PATIENT INSTRUCTIONS
1. CAD. Elevated calcium score 581 units.  Seen in the emergency room 7/13/2024 with chest pain.  Evidently he did run out of his lisinopril.  Biomarkers normal.  No EKG changes.  No recurrence.  Some of this may have been related to the fact that his CPAP machine was not working well that night.  This is actually woke him up.  Last stress test April 2023 10.10 METS, normal perfusion ejection fraction 65% will continue to follow this.  At this point no change in therapy.    2. Hyperlipidemia. His lipids have improved considerably. Total cholesterol 179 LDL down to 67 HDL 63. Triglycerides are a bit high at 244.  7/13/2024.  LDL 89 HDL 71 triglycerides 188.  I will add Zetia 10 mg.  Target LDL less than 70 preferably closer to 55    3. Hypertension blood pressures at home significantly better than what they are here..     4. Tachycardia.  Tachycardic when he initially arrived.  His heart rate came down into the 80s.  As he shows me a list of his blood pressures and pulse rates, they are typically reasonable at home.  Increasing activity decreasing alcohol consumption will help    5.  CKD 6/5/2024 BUN 30 creatinine 1.88, GFR 42.  12/6/2023 GFR was 67 with a creatinine 1.27. Now off NSAIDS and creat has improved.    EKG today. RTC 1 year

## 2024-07-29 NOTE — PROGRESS NOTES
Referred by No ref. provider found    HPI I am seeing Ryan for a year and a half follow-up.  Overall he been doing well.  Several weeks ago he had an episode where he woke up in the early morning.  His CPAP machine was not working well.  He experiences discomfort in his chest.  He stated home in the chair for an hour before he came to the emergency room.  All in all his symptoms were there for approximate 2 hours.  In the emergency room biomarkers normal with troponins of 18 x 2.  EKG is unremarkable.  No recurrence since then.    Past Medical History:  Problem List Items Addressed This Visit    None     Past Medical History:   Diagnosis Date    Arteriosclerosis of coronary artery 07/09/2020    Elevated 581 Agatston units (04/12/2019)    Arthropathic psoriasis, unspecified (Multi) 11/24/2013    Psoriatic arthropathy    Arthropathic psoriasis, unspecified (Multi) 02/21/2018    Psoriasis with arthropathy    Coronary artery disease     Effusion, left knee 08/22/2018    Effusion of left knee    Encounter for health counseling related to travel 05/14/2019    Counseling for travel    Encounter for screening for malignant neoplasm of rectum     Screening for malignant neoplasm of the rectum    Hyperlipidemia 03/21/2012    PCP follows    Hypertension     Other long term (current) drug therapy     High risk medication use    Overweight 12/11/2019    Overweight    Personal history of diseases of the skin and subcutaneous tissue     History of psoriasis    Personal history of other diseases of the respiratory system 05/31/2017    History of sore throat    Personal history of other endocrine, nutritional and metabolic disease 06/23/2015    History of hypercholesterolemia    Personal history of other specified conditions 03/22/2021    History of chest pain    Sciatica          Past Surgical History:  He has a past surgical history that includes Refractive surgery (06/10/2019); Appendectomy (06/10/2019); Knee arthroscopy w/  debridement (06/10/2019); and Anterior cruciate ligament repair.      Social History:  He reports that he has never smoked. He has never used smokeless tobacco. He reports current alcohol use of about 10.0 standard drinks of alcohol per week. He reports that he does not use drugs.    Family History:  Family History   Problem Relation Name Age of Onset    Depression Mother Kathe     Arthritis Mother Kathe     Other (CARDIAC DISORDER) Mother Kathe     Osteoarthritis Mother Kathe     Osteoporosis Mother Kathe     Cancer Mother Kathe     Cancer Father      Other (Other) Father      Coronary artery disease Father      Stroke Sister Mayelin     Asthma Sister Mayelin     Rheum arthritis Paternal Grandmother      Stroke Sister Melly      Allergies:  Patient has no known allergies.    Outpatient Medications:  Current Outpatient Medications   Medication Instructions    allopurinol (ZYLOPRIM) 300 mg, oral, Daily    aspirin 81 mg EC tablet 1 tablet, oral, Daily    atorvastatin (LIPITOR) 80 mg, oral, Daily    celecoxib (CELEBREX) 200 mg, oral, Daily    lisinopril 20 mg tablet 1 tablet, oral, Daily    lisinopril 20 mg, oral, Daily    sildenafil (Revatio) 20 mg tablet TAKE 2 TO 5 TABLETS BY MOUTH ONCE DAILY AS NEEDED FOR SEXUALY ACTIVITY     Last Recorded Vitals:  There were no vitals filed for this visit.    Physical Exam  Patient is alert and oriented x3.  HEENT is unremarkable mucous members are moist  Neck no JVP no bruits upstrokes are full no thyromegaly  Lungs are clear bilaterally.  No wheezing crackles or rales  Heart regular rhythm normal S1-S2 there is no S3 no murmurs are heard.  Abdomen is soft bs are positive nontender nondistended no organomegaly no pulsatile masses  Extremities have no edema.  Distal pulses present palpable.  Neuro is grossly nonfocal  Skin has no rashes     Last Labs:  CBC -  Lab Results   Component Value Date    WBC 7.1 07/13/2024    HGB 14.7 07/13/2024    HCT 42.9 07/13/2024    MCV 96 07/13/2024      (L) 07/13/2024     CMP -  Lab Results   Component Value Date    CALCIUM 9.9 07/13/2024    PROT 7.5 07/13/2024    ALBUMIN 4.5 07/13/2024    AST 40 (H) 07/13/2024    ALT 46 07/13/2024    ALKPHOS 83 07/13/2024    BILITOT 0.6 07/13/2024     LIPID PANEL -   Lab Results   Component Value Date    CHOL 198 06/27/2024    HDL 71.0 06/27/2024    CHHDL 2.8 06/27/2024    VLDL 38 06/27/2024    TRIG 188 (H) 06/27/2024    NHDL 127 06/27/2024     RENAL FUNCTION PANEL -   Lab Results   Component Value Date    K 3.8 07/13/2024     Lab Results   Component Value Date    HGBA1C 5.2 06/27/2024   Procedure  EX NST [04/10/2023]: 8 min 30 sec (10.10 METs) . . . Normal â€“ 65%     EX NST [07/24/2019]: 7 min 51 sec (10.10 METs) . . . Normal â€“ 64%     CAC (04/12/2019) = 563.1 (.6, .8, LCx 95.8, RCA 24) ... 97%... Mild patchy bibasilar infilt /atel. Subcm nodule along L-major fissure. Prominence aortic arch on  view not including on CT imaging.       Assessment/Plan   1. CAD. Elevated calcium score 581 units.  Seen in the emergency room 7/13/2024 with chest pain.  Evidently he did run out of his lisinopril.  Biomarkers normal.  No EKG changes.  No recurrence.  Some of this may have been related to the fact that his CPAP machine was not working well that night.  This is actually woke him up.  Last stress test April 2023 10.10 METS, normal perfusion ejection fraction 65% will continue to follow this.  At this point no change in therapy.    2. Hyperlipidemia. His lipids have improved considerably. Total cholesterol 179 LDL down to 67 HDL 63. Triglycerides are a bit high at 244.  7/13/2024.  LDL 89 HDL 71 triglycerides 188.  I will add Zetia 10 mg.  Target LDL less than 70 preferably closer to 55    3. Hypertension blood pressures at home significantly better than what they are here..     4. Tachycardia.  Tachycardic when he initially arrived.  His heart rate came down into the 80s.  As he shows me a list of his blood  pressures and pulse rates, they are typically reasonable at home.  Increasing activity decreasing alcohol consumption will help    5.  CKD 6/5/2024 BUN 30 creatinine 1.88, GFR 42.  12/6/2023 GFR was 67 with a creatinine 1.27. Now off NSAIDS and creat has improved.    EKG today. RTC 1 year    Alka Bianchi MD     Instructions and follow up

## 2024-07-31 LAB
ATRIAL RATE: 95 BPM
P AXIS: 20 DEGREES
P OFFSET: 206 MS
P ONSET: 152 MS
PR INTERVAL: 144 MS
Q ONSET: 224 MS
QRS COUNT: 16 BEATS
QRS DURATION: 74 MS
QT INTERVAL: 322 MS
QTC CALCULATION(BAZETT): 404 MS
QTC FREDERICIA: 375 MS
R AXIS: 46 DEGREES
T AXIS: 66 DEGREES
T OFFSET: 385 MS
VENTRICULAR RATE: 95 BPM

## 2024-08-12 ENCOUNTER — APPOINTMENT (OUTPATIENT)
Dept: SLEEP MEDICINE | Facility: CLINIC | Age: 54
End: 2024-08-12
Payer: COMMERCIAL

## 2024-09-03 DIAGNOSIS — I25.10 ARTERIOSCLEROSIS OF CORONARY ARTERY: Chronic | ICD-10-CM

## 2024-09-03 DIAGNOSIS — E78.5 HYPERLIPIDEMIA, UNSPECIFIED HYPERLIPIDEMIA TYPE: ICD-10-CM

## 2024-09-03 DIAGNOSIS — R07.89 ATYPICAL CHEST PAIN: ICD-10-CM

## 2024-09-03 RX ORDER — LISINOPRIL 20 MG/1
20 TABLET ORAL DAILY
Qty: 90 TABLET | Refills: 3 | Status: SHIPPED | OUTPATIENT
Start: 2024-09-03 | End: 2025-09-03

## 2024-09-03 RX ORDER — EZETIMIBE 10 MG/1
10 TABLET ORAL DAILY
Qty: 90 TABLET | Refills: 3 | Status: SHIPPED | OUTPATIENT
Start: 2024-09-03 | End: 2025-09-03

## 2024-09-03 RX ORDER — ATORVASTATIN CALCIUM 80 MG/1
80 TABLET, FILM COATED ORAL DAILY
Qty: 90 TABLET | Refills: 3 | Status: SHIPPED | OUTPATIENT
Start: 2024-09-03

## 2024-12-11 ENCOUNTER — LAB (OUTPATIENT)
Dept: LAB | Facility: LAB | Age: 54
End: 2024-12-11
Payer: COMMERCIAL

## 2024-12-11 ENCOUNTER — APPOINTMENT (OUTPATIENT)
Dept: RHEUMATOLOGY | Facility: CLINIC | Age: 54
End: 2024-12-11
Payer: COMMERCIAL

## 2024-12-11 VITALS
SYSTOLIC BLOOD PRESSURE: 155 MMHG | HEART RATE: 96 BPM | OXYGEN SATURATION: 95 % | TEMPERATURE: 98.4 F | DIASTOLIC BLOOD PRESSURE: 81 MMHG

## 2024-12-11 DIAGNOSIS — G89.29 CHRONIC PAIN OF LEFT KNEE: ICD-10-CM

## 2024-12-11 DIAGNOSIS — M10.9 GOUT, UNSPECIFIED CAUSE, UNSPECIFIED CHRONICITY, UNSPECIFIED SITE: Primary | ICD-10-CM

## 2024-12-11 DIAGNOSIS — M17.0 OSTEOARTHRITIS OF BOTH KNEES, UNSPECIFIED OSTEOARTHRITIS TYPE: ICD-10-CM

## 2024-12-11 DIAGNOSIS — M10.9 GOUT, UNSPECIFIED CAUSE, UNSPECIFIED CHRONICITY, UNSPECIFIED SITE: ICD-10-CM

## 2024-12-11 DIAGNOSIS — M25.562 CHRONIC PAIN OF LEFT KNEE: ICD-10-CM

## 2024-12-11 LAB
ALBUMIN SERPL BCP-MCNC: 4.6 G/DL (ref 3.4–5)
ALP SERPL-CCNC: 80 U/L (ref 33–120)
ALT SERPL W P-5'-P-CCNC: 23 U/L (ref 10–52)
AST SERPL W P-5'-P-CCNC: 24 U/L (ref 9–39)
BILIRUB DIRECT SERPL-MCNC: 0.1 MG/DL (ref 0–0.3)
BILIRUB SERPL-MCNC: 0.7 MG/DL (ref 0–1.2)
CREAT SERPL-MCNC: 1.61 MG/DL (ref 0.5–1.3)
EGFRCR SERPLBLD CKD-EPI 2021: 50 ML/MIN/1.73M*2
PROT SERPL-MCNC: 6.9 G/DL (ref 6.4–8.2)
URATE SERPL-MCNC: 10 MG/DL (ref 4–7.5)

## 2024-12-11 PROCEDURE — 36415 COLL VENOUS BLD VENIPUNCTURE: CPT

## 2024-12-11 PROCEDURE — 20611 DRAIN/INJ JOINT/BURSA W/US: CPT | Performed by: INTERNAL MEDICINE

## 2024-12-11 PROCEDURE — 84550 ASSAY OF BLOOD/URIC ACID: CPT

## 2024-12-11 PROCEDURE — 3079F DIAST BP 80-89 MM HG: CPT | Performed by: INTERNAL MEDICINE

## 2024-12-11 PROCEDURE — 3077F SYST BP >= 140 MM HG: CPT | Performed by: INTERNAL MEDICINE

## 2024-12-11 PROCEDURE — 82565 ASSAY OF CREATININE: CPT

## 2024-12-11 PROCEDURE — 99213 OFFICE O/P EST LOW 20 MIN: CPT | Performed by: INTERNAL MEDICINE

## 2024-12-11 PROCEDURE — 1036F TOBACCO NON-USER: CPT | Performed by: INTERNAL MEDICINE

## 2024-12-11 PROCEDURE — 80076 HEPATIC FUNCTION PANEL: CPT

## 2024-12-11 RX ORDER — TRIAMCINOLONE ACETONIDE 40 MG/ML
40 INJECTION, SUSPENSION INTRA-ARTICULAR; INTRAMUSCULAR
Status: COMPLETED | OUTPATIENT
Start: 2024-12-11 | End: 2024-12-11

## 2024-12-11 RX ORDER — LIDOCAINE HYDROCHLORIDE 10 MG/ML
1 INJECTION, SOLUTION INFILTRATION; PERINEURAL
Status: COMPLETED | OUTPATIENT
Start: 2024-12-11 | End: 2024-12-11

## 2024-12-11 ASSESSMENT — PAIN SCALES - GENERAL: PAINLEVEL_OUTOF10: 0-NO PAIN

## 2024-12-11 NOTE — PROGRESS NOTES
Follow-up Rheumatology Patient Visit    Chief Complaint:  Ryan Moore is a 54 y.o. male presenting today for Follow-up (fuv).    History of Presenting Problem:   55 y/o male with Psoriasis and Gout present for f/u.  Patient reports stopped taking Celebrex after he was told about his renal function.  He also stopped taking allopurinol by accident but stayed off of it because he has not had any joint issues except he does reports persistent left knee pain.  He has been told by his primary care he has patella arthritis  Hx of  lumbar herniation between L4 and L5 and is received an injection and PT from pain management.      Problem List:   Patient Active Problem List   Diagnosis    HTN (hypertension)    Gout    Hyperlipidemia    Obstructive sleep apnea syndrome    Vitamin D deficiency    Abnormal computed tomography scan    Lumbar radiculopathy    Overweight    Hip weakness    Arteriosclerosis of coronary artery    Atypical chest pain    Squamous cell carcinoma of skin of other parts of face    Neoplasm of uncertain behavior of skin    Lumbar disc herniation    Hospital discharge follow-up       Past Medical History:   Past Medical History:   Diagnosis Date    Arteriosclerosis of coronary artery 07/09/2020    Elevated 581 Agatston units (04/12/2019)    Arthropathic psoriasis, unspecified (Multi) 11/24/2013    Psoriatic arthropathy    Arthropathic psoriasis, unspecified (Multi) 02/21/2018    Psoriasis with arthropathy    Coronary artery disease     Effusion, left knee 08/22/2018    Effusion of left knee    Encounter for health counseling related to travel 05/14/2019    Counseling for travel    Encounter for screening for malignant neoplasm of rectum     Screening for malignant neoplasm of the rectum    Hyperlipidemia 03/21/2012    PCP follows    Hypertension     Obstructive sleep apnea syndrome 05/02/2023    Symptomatic with long standing snoring, newer apnea, un-refreshing quality of sleep and newer daytime sleepiness   Diagnostics: Severe ULISES by HSAT June 16, 2022: EDWARD 44 (35 if using 4% criteria)  Tx:  Resmed setup 9-22-22  DME: [facility]  Responsive to PAP thearpy with excellent usage and multiple benefits (more energy, less snoring, no more sore throat etc).        Other long term (current) drug therapy     High risk medication use    Overweight 12/11/2019    Overweight    Personal history of diseases of the skin and subcutaneous tissue     History of psoriasis    Personal history of other diseases of the respiratory system 05/31/2017    History of sore throat    Personal history of other endocrine, nutritional and metabolic disease 06/23/2015    History of hypercholesterolemia    Personal history of other specified conditions 03/22/2021    History of chest pain    Sciatica        Surgical History:   Past Surgical History:   Procedure Laterality Date    ANTERIOR CRUCIATE LIGAMENT REPAIR      APPENDECTOMY  06/10/2019    Appendectomy    KNEE ARTHROSCOPY W/ DEBRIDEMENT  06/10/2019    Arthroscopy Knee Left    REFRACTIVE SURGERY  06/10/2019    Corneal LASIK        Allergies: No Known Allergies    Medications:   Current Outpatient Medications:     aspirin 81 mg EC tablet, Take 1 tablet (81 mg) by mouth once daily., Disp: , Rfl:     atorvastatin (Lipitor) 80 mg tablet, Take 1 tablet (80 mg) by mouth once daily., Disp: 90 tablet, Rfl: 3    ezetimibe (Zetia) 10 mg tablet, Take 1 tablet (10 mg) by mouth once daily., Disp: 90 tablet, Rfl: 3    lisinopril 20 mg tablet, Take 1 tablet (20 mg) by mouth once daily., Disp: 90 tablet, Rfl: 3    sildenafil (Revatio) 20 mg tablet, TAKE 2 TO 5 TABLETS BY MOUTH ONCE DAILY AS NEEDED FOR SEXUALY ACTIVITY, Disp: 50 tablet, Rfl: 11      Objective   Physical Examination:    Visit Vitals  /81 (BP Location: Left arm, Patient Position: Sitting, BP Cuff Size: Adult)   Pulse 96   Temp 36.9 °C (98.4 °F) (Oral)   SpO2 95%   Smoking Status Never        Gen: NAD, A&O x 3  HEENT: clear sclera and conjunctiva,      Musculoskeletal:   Neck; WNL, full ROM  Shoulder: WNL, full ROM  Elbow:WNL, full ROM, no effusion noted  Wrist and fingers;no active synovitis noted, Full ROM in the Wrist , Good fist and   Knees:  No effusions or crepitation, full ROM.  Hips; WNL, full ROM, Negative Chet test  Ankle, Feet; WNL, full ROM    Skin: No rashes or lesions seen, no nail changes  Neuro: A&O x3, Normal Gait    Large Joint Injection/Arthrocentesis: L knee on 12/11/2024 3:48 PM  Indications: pain and joint swelling  Details: 21 G needle, ultrasound-guided medial approach  Medications: 40 mg triamcinolone acetonide 40 mg/mL; 1 mL lidocaine 10 mg/mL (1 %)    Procedure, treatment alternatives, risks and benefits explained, specific risks discussed. Consent was given by the patient. Immediately prior to procedure a time out was called to verify the correct patient and procedure.     Preparation: alcohol was used to prep the area and betadine was used to prep the area. Ethyl chloride spray was used as a topical anesthetic.   Procedure Note: Using sterile technique, the aspiration/injection needle was then directed from a medial aspect. The procedure was ultrasound guided.  Was used to inject 1 mL of 1% Lidocaine and 1 mL of 40mg/mL triamcinolone. A bandage was applied.   Fluid Aspirated:   Post-Procedure: the patient tolerated the procedure well.   Complications: None.   Patient instructed to avoid strenuous activity for 1 day(s).    Consent was given by the patient.        :None    Orders:  No orders of the defined types were placed in this encounter.       Provider Impression:   Assessment/Plan   No diagnosis found.       54 y/o male with Hx of seronegative inflammatory arthritis, Hx of Psoriasis present with joint complaint in the left knee and thumb, which seem to be resolved with Diet change. Current work show show joint changes consistent with OA and and also possible inflammatory arthritis given joint changes in symmetric joints  including in the wrist. Also noted to have elevated uric acid which suggested underlying gout. MRI show tricompartment arthritis and ligament derangement.   -I agree with stopping Celebrex at this time  -Would recommend continue Allopurinol 300 mg daily, however will check patient's uric acid level at this time  -Patient consented and tolerated left knee injection with 40 mg of Kenalog  -Check routine labs  -F/u in 6 months

## 2025-01-09 DIAGNOSIS — M10.9 GOUT, UNSPECIFIED CAUSE, UNSPECIFIED CHRONICITY, UNSPECIFIED SITE: Primary | ICD-10-CM

## 2025-01-09 RX ORDER — ALLOPURINOL 300 MG/1
300 TABLET ORAL DAILY
Qty: 60 TABLET | Refills: 0 | Status: SHIPPED | OUTPATIENT
Start: 2025-01-09 | End: 2025-03-10

## 2025-05-20 DIAGNOSIS — E78.5 HYPERLIPIDEMIA, UNSPECIFIED HYPERLIPIDEMIA TYPE: ICD-10-CM

## 2025-05-20 RX ORDER — ATORVASTATIN CALCIUM 80 MG/1
80 TABLET, FILM COATED ORAL DAILY
Qty: 90 TABLET | Refills: 3 | Status: SHIPPED | OUTPATIENT
Start: 2025-05-20

## 2025-06-11 ENCOUNTER — APPOINTMENT (OUTPATIENT)
Dept: RHEUMATOLOGY | Facility: CLINIC | Age: 55
End: 2025-06-11
Payer: COMMERCIAL

## 2025-07-08 ENCOUNTER — APPOINTMENT (OUTPATIENT)
Dept: RHEUMATOLOGY | Facility: CLINIC | Age: 55
End: 2025-07-08
Payer: COMMERCIAL

## 2025-07-09 ENCOUNTER — APPOINTMENT (OUTPATIENT)
Dept: RHEUMATOLOGY | Facility: CLINIC | Age: 55
End: 2025-07-09
Payer: COMMERCIAL

## 2025-07-09 VITALS
TEMPERATURE: 98 F | DIASTOLIC BLOOD PRESSURE: 73 MMHG | SYSTOLIC BLOOD PRESSURE: 168 MMHG | HEART RATE: 100 BPM | OXYGEN SATURATION: 95 %

## 2025-07-09 DIAGNOSIS — M10.9 GOUT, UNSPECIFIED CAUSE, UNSPECIFIED CHRONICITY, UNSPECIFIED SITE: Primary | ICD-10-CM

## 2025-07-09 DIAGNOSIS — M17.0 OSTEOARTHRITIS OF BOTH KNEES, UNSPECIFIED OSTEOARTHRITIS TYPE: ICD-10-CM

## 2025-07-09 PROCEDURE — 3078F DIAST BP <80 MM HG: CPT | Performed by: INTERNAL MEDICINE

## 2025-07-09 PROCEDURE — 3077F SYST BP >= 140 MM HG: CPT | Performed by: INTERNAL MEDICINE

## 2025-07-09 PROCEDURE — 1036F TOBACCO NON-USER: CPT | Performed by: INTERNAL MEDICINE

## 2025-07-09 PROCEDURE — 99213 OFFICE O/P EST LOW 20 MIN: CPT | Performed by: INTERNAL MEDICINE

## 2025-07-09 RX ORDER — ALLOPURINOL 300 MG/1
300 TABLET ORAL DAILY
Qty: 90 TABLET | Refills: 1 | Status: SHIPPED | OUTPATIENT
Start: 2025-07-09

## 2025-07-09 RX ORDER — ALLOPURINOL 300 MG/1
300 TABLET ORAL DAILY
COMMUNITY
Start: 2025-07-08 | End: 2025-07-09 | Stop reason: SDUPTHER

## 2025-07-09 NOTE — PROGRESS NOTES
Follow-up Rheumatology Patient Visit    Chief Complaint:  Ryan Moore is a 54 y.o. male presenting today for Follow-up (fuv).    History of Presenting Problem:   53 y/o male with Psoriasis and Gout present for f/u.  Patient reports stopped taking Celebrex after he was told about his renal function.   Patient is back on allopurinol 300 mg daily  He report he had PRP injection in the knees March 2025 which seem to be helping.    Problem List:   Patient Active Problem List   Diagnosis    HTN (hypertension)    Gout    Hyperlipidemia    Obstructive sleep apnea syndrome    Vitamin D deficiency    Abnormal computed tomography scan    Lumbar radiculopathy    Overweight    Hip weakness    Arteriosclerosis of coronary artery    Atypical chest pain    Squamous cell carcinoma of skin of other parts of face    Neoplasm of uncertain behavior of skin    Lumbar disc herniation    Hospital discharge follow-up       Past Medical History:   Past Medical History:   Diagnosis Date    Arteriosclerosis of coronary artery 07/09/2020    Elevated 581 Agatston units (04/12/2019)    Arthropathic psoriasis, unspecified (Multi) 11/24/2013    Psoriatic arthropathy    Arthropathic psoriasis, unspecified (Multi) 02/21/2018    Psoriasis with arthropathy    Coronary artery disease     Effusion, left knee 08/22/2018    Effusion of left knee    Encounter for health counseling related to travel 05/14/2019    Counseling for travel    Encounter for screening for malignant neoplasm of rectum     Screening for malignant neoplasm of the rectum    Hyperlipidemia 03/21/2012    PCP follows    Hypertension     Obstructive sleep apnea syndrome 05/02/2023    Symptomatic with long standing snoring, newer apnea, un-refreshing quality of sleep and newer daytime sleepiness  Diagnostics: Severe ULISES by HSAT June 16, 2022: EDWARD 44 (35 if using 4% criteria)  Tx:  Resmed setup 9-22-22  DME: [facility]  Responsive to PAP thearpy with excellent usage and multiple benefits (more  energy, less snoring, no more sore throat etc).        Other long term (current) drug therapy     High risk medication use    Overweight 12/11/2019    Overweight    Personal history of diseases of the skin and subcutaneous tissue     History of psoriasis    Personal history of other diseases of the respiratory system 05/31/2017    History of sore throat    Personal history of other endocrine, nutritional and metabolic disease 06/23/2015    History of hypercholesterolemia    Personal history of other specified conditions 03/22/2021    History of chest pain    Sciatica        Surgical History:   Past Surgical History:   Procedure Laterality Date    ANTERIOR CRUCIATE LIGAMENT REPAIR      APPENDECTOMY  06/10/2019    Appendectomy    KNEE ARTHROSCOPY W/ DEBRIDEMENT  06/10/2019    Arthroscopy Knee Left    REFRACTIVE SURGERY  06/10/2019    Corneal LASIK        Allergies: No Known Allergies    Medications:   Current Outpatient Medications:     allopurinol (Zyloprim) 300 mg tablet, Take 1 tablet (300 mg) by mouth once daily., Disp: , Rfl:     aspirin 81 mg EC tablet, Take 1 tablet (81 mg) by mouth once daily., Disp: , Rfl:     atorvastatin (Lipitor) 80 mg tablet, Take 1 tablet (80 mg) by mouth once daily., Disp: 90 tablet, Rfl: 3    ezetimibe (Zetia) 10 mg tablet, Take 1 tablet (10 mg) by mouth once daily., Disp: 90 tablet, Rfl: 3    lisinopril 20 mg tablet, Take 1 tablet (20 mg) by mouth once daily., Disp: 90 tablet, Rfl: 3    sildenafil (Revatio) 20 mg tablet, TAKE 2 TO 5 TABLETS BY MOUTH ONCE DAILY AS NEEDED FOR SEXUALY ACTIVITY (Patient not taking: Reported on 7/9/2025), Disp: 50 tablet, Rfl: 11      Objective   Physical Examination:    Visit Vitals  /73 (BP Location: Left arm, Patient Position: Sitting, BP Cuff Size: Adult)   Pulse 100   Temp 36.7 °C (98 °F) (Temporal)   SpO2 95%   Smoking Status Never        Gen: NAD, A&O x 3  HEENT: clear sclera and conjunctiva,     Musculoskeletal:   Neck; WNL, full  ROM  Shoulder: WNL, full ROM  Elbow:WNL, full ROM, no effusion noted  Wrist and fingers;no active synovitis noted, Full ROM in the Wrist , Good fist and   Knees:  No effusions or crepitation, full ROM.  Hips; WNL, full ROM, Negative Chet test  Ankle, Feet; WNL, full ROM    Skin: No rashes or lesions seen, no nail changes  Neuro: A&O x3, Normal Gait    Procedures :None    Orders:  No orders of the defined types were placed in this encounter.       Provider Impression:   Assessment/Plan   Encounter Diagnoses   Name Primary?    Gout, unspecified cause, unspecified chronicity, unspecified site Yes    Osteoarthritis of both knees, unspecified osteoarthritis type        55 y/o male with Hx of seronegative inflammatory arthritis, Hx of Psoriasis present with joint complaint in the left knee and thumb, which seem to be resolved with Diet change. Current work show show joint changes consistent with OA and and also possible inflammatory arthritis given joint changes in symmetric joints including in the wrist. Also noted to have elevated uric acid which suggested underlying gout. MRI show tricompartment arthritis and ligament derangement.   -Continue Allopurinol 300 mg daily  -Check routine labs   -we deferred on joint injection at this visit but patient can call back for this as needed  -F/u in 6 months

## 2025-07-11 LAB
ALBUMIN SERPL-MCNC: 4.6 G/DL (ref 3.6–5.1)
ALBUMIN/GLOB SERPL: 2 (CALC) (ref 1–2.5)
ALP SERPL-CCNC: 72 U/L (ref 35–144)
ALT SERPL-CCNC: 20 U/L (ref 9–46)
AST SERPL-CCNC: 24 U/L (ref 10–35)
BILIRUB DIRECT SERPL-MCNC: 0.1 MG/DL
BILIRUB INDIRECT SERPL-MCNC: 0.5 MG/DL (CALC) (ref 0.2–1.2)
BILIRUB SERPL-MCNC: 0.6 MG/DL (ref 0.2–1.2)
CREAT SERPL-MCNC: 1.37 MG/DL (ref 0.7–1.3)
EGFRCR SERPLBLD CKD-EPI 2021: 61 ML/MIN/1.73M2
GLOBULIN SER CALC-MCNC: 2.3 G/DL (CALC) (ref 1.9–3.7)
PROT SERPL-MCNC: 6.9 G/DL (ref 6.1–8.1)
URATE SERPL-MCNC: 4.7 MG/DL (ref 4–8)

## 2025-07-14 ENCOUNTER — APPOINTMENT (OUTPATIENT)
Dept: SLEEP MEDICINE | Facility: CLINIC | Age: 55
End: 2025-07-14
Payer: COMMERCIAL

## 2025-07-14 VITALS
SYSTOLIC BLOOD PRESSURE: 160 MMHG | WEIGHT: 230.5 LBS | DIASTOLIC BLOOD PRESSURE: 101 MMHG | HEIGHT: 74 IN | BODY MASS INDEX: 29.58 KG/M2 | HEART RATE: 104 BPM

## 2025-07-14 DIAGNOSIS — G47.33 OSA (OBSTRUCTIVE SLEEP APNEA): Primary | ICD-10-CM

## 2025-07-14 DIAGNOSIS — G47.33 OBSTRUCTIVE SLEEP APNEA SYNDROME: Chronic | ICD-10-CM

## 2025-07-14 PROCEDURE — 3080F DIAST BP >= 90 MM HG: CPT | Performed by: INTERNAL MEDICINE

## 2025-07-14 PROCEDURE — 3008F BODY MASS INDEX DOCD: CPT | Performed by: INTERNAL MEDICINE

## 2025-07-14 PROCEDURE — 99214 OFFICE O/P EST MOD 30 MIN: CPT | Performed by: INTERNAL MEDICINE

## 2025-07-14 PROCEDURE — 3077F SYST BP >= 140 MM HG: CPT | Performed by: INTERNAL MEDICINE

## 2025-07-14 ASSESSMENT — PATIENT HEALTH QUESTIONNAIRE - PHQ9
1. LITTLE INTEREST OR PLEASURE IN DOING THINGS: NOT AT ALL
SUM OF ALL RESPONSES TO PHQ9 QUESTIONS 1 AND 2: 0
2. FEELING DOWN, DEPRESSED OR HOPELESS: NOT AT ALL

## 2025-07-14 ASSESSMENT — ENCOUNTER SYMPTOMS
LOSS OF SENSATION IN FEET: 0
OCCASIONAL FEELINGS OF UNSTEADINESS: 0
DEPRESSION: 0

## 2025-07-14 NOTE — PROGRESS NOTES
Patient: Ryan Moore   Patient info: [id number]  : 1970 -- AGE 54 y.o.    Clinician: Skye Hickey MD   Location [facility]   Service Date: 2025          [facility]  Follow-up Visit Note       HISTORY OF PRESENT ILLNESS     Ryan Moore is a 54 y.o. male who presents to a [facility] Sleep Medicine [facility] for followup.     The patient  has a past medical history of Arteriosclerosis of coronary artery (2020), Arthropathic psoriasis, unspecified (Multi) (2013), Arthropathic psoriasis, unspecified (Multi) (2018), Coronary artery disease, Effusion, left knee (2018), Encounter for health counseling related to travel (2019), Encounter for screening for malignant neoplasm of rectum, Hyperlipidemia (2012), Hypertension, Obstructive sleep apnea syndrome (2023), Other long term (current) drug therapy, Overweight (2019), Personal history of diseases of the skin and subcutaneous tissue, Personal history of other diseases of the respiratory system (2017), Personal history of other endocrine, nutritional and metabolic disease (2015), Personal history of other specified conditions (2021), and Sciatica.    PAST SLEEP HISTORY   Last seen: 2024  Summary of last visit:   ASSESSMENT/PLAN   Mr. Moore is a 53 y.o. male  returning to the  Sleep Medicine Clinic for follow-up of UILSES, severe, responsive to PAP thearpy and on last visit we made slight adjustments to the pressure. Detailed review of the download raw data today shows acceptable AHI and other indices. One night outlier- otherwise current pressure settings (which were changed from last visit) look good.      Travel machine is malfunctioning and needs to be examined.   BP today is elevated (asymptomatic) but BP readings at home acceptable for the most part. Continues to track and will see cardiology with these  values.      Problem List and Plan/Orders  Problem List Items Addressed This Visit         HTN (hypertension) - Primary     Obstructive sleep apnea syndrome     Overview       Symptomatic with long standing snoring, newer apnea, un-refreshing quality of sleep and newer daytime sleepiness  Diagnostics: Severe ULISES by HSAT June 16, 2022: EDWARD 44 (35 if using 4% criteria)  Tx: responsive to PAP thearpy with excellent usage and multiple benefits (more energy, less snoring, no more sore throat etc).             Relevant Orders     Positive Airway Pressure (PAP) Therapy      CGI:  2-much improved      Recommendations/Plan/Management:  Continue AutoPAP [id number]  Continue supplies  DME: [facility]  Travel machine: not working properly, cannot use and likely malfunctioning.  Reached out to DME: [facility]--> they can take a look at this machine   Start flonase  Then trial with nasal mask (given at prior visit)  Adequate sleep duration and appropriate timing as he is doing.   Tracking BP--> seeing cardiology        Followup: 12 months, provided contacts for interim care if needed.     Skye Hickey MD        INTERIM DATA REVIEW:  Personally reviewed any raw data such as interpretation report, data sheet, hypnogram, and titration table if available and applicable  Notes and encounters in interim    CURRENT HISTORY/CONCERNS    On today's visit, the patient reports no new concerns.   Since seeing cardiologist Dr. Bianchi--doing more exercise: swims laps and does weights 2x/week    For Obstructive sleep apnea:  Treatment: with PAP Therapy:  ongoing nightly, usage,   Machine:    Resmed 9-22-22  Mask: [employer]--> Due for supplies;    Mask wipes   Nasal sample--> never tried --> will try      PAP benefits:   Patient reports benefits from using PAP as follows:  no snoring feels better in am.   PAP Adherence DATA:   A PAP adherence download was obtained and raw data was visually reviewed personally today in clinic. (see scanned document in  EPIC)  Compliance Report  Usage 06/11/2025 - 07/10/2025  Usage days 30/30 days (100%)  >= 4 hours 29 days (97%)  < 4 hours 1 days (3%)  Usage hours 206 hours 56 minutes  Average usage (total days) 6 hours 54 minutes  Average usage (days used) 6 hours 54 minutes  Median usage (days used) 7 hours 5 minutes  Total used hours (value since last reset - 07/10/2025) 7,045 hours  AirSense 11 AutoSet  Serial number [id number]  Mode AutoSet  Min Pressure 5 cmH2O  Max Pressure 16 cmH2O  EPR Fulltime  EPR level 3  Response Standard  Therapy  Pressure - cmH2O Median: 8.4 95th percentile: 12.2 Maximum: 14.0  Leaks - L/min Median: 0.2 95th percentile: 6.2 Maximum: 20.6  Events per hour AI: 1.5 HI: 0.5 AHI: 2.0  Apnea Index Central: 0.4 Obstructive: 1.1 Unknown: 0.0  RERA Index 0.2       Sleep-Wake Schedule:   Night meds prior to sleep: lisinopril, statin, allopurinol for gout, aspirin,   Bedtime:/sleep latency/Wake time: regular  Weekends: no changes    Awakenings:  nocturia 1x   Total nocturnal sleep duration: 7 hours      REVIEW OF SYSTEMS   Review of Systems  No current symptoms, denies CP. SOB,     ALLERGIES AND MEDICATIONS   Allergies[1]    MEDICATIONS:   Current Medications[2]      HISTORIES   PAST MEDICAL HISTORY : He  has a past medical history of Arteriosclerosis of coronary artery (07/09/2020), Arthropathic psoriasis, unspecified (Multi) (11/24/2013), Arthropathic psoriasis, unspecified (Multi) (02/21/2018), Coronary artery disease, Effusion, left knee (08/22/2018), Encounter for health counseling related to travel (05/14/2019), Encounter for screening for malignant neoplasm of rectum, Hyperlipidemia (03/21/2012), Hypertension, Obstructive sleep apnea syndrome (05/02/2023), Other long term (current) drug therapy, Overweight (12/11/2019), Personal history of diseases of the skin and subcutaneous tissue, Personal history of other diseases of the respiratory system (05/31/2017), Personal history of other endocrine,  "nutritional and metabolic disease (06/23/2015), Personal history of other specified conditions (03/22/2021), and Sciatica.  Problem List[3]  PAST SURGICAL HISTORY: He  has a past surgical history that includes Refractive surgery (06/10/2019); Appendectomy (06/10/2019); Knee arthroscopy w/ debridement (06/10/2019); and Anterior cruciate ligament repair.   FAMILY HISTORY: No changes since previous visit. Otherwise non-contributory as charted.     SOCIAL HISTORY  Patient:  reports that he has never smoked. He has never used smokeless tobacco. He reports current alcohol use of about 10.0 standard drinks of alcohol per week. He reports that he does not use drugs.     PHYSICAL EXAM     VITAL SIGNS:  BP (!) 160/101 (BP Location: Left arm, Patient Position: Sitting, BP Cuff Size: Adult)   Pulse 104   Ht 1.88 m (6' 2\")   Wt 105 kg (230 lb 8 oz)   BMI 29.59 kg/m²   Recheck  165/85  with appropriate sized cuff (pt said this was a different cuff color so other one may have been too tight) still elevated.   138 SBP in am checks BP at home   --- stress coming     PREVIOUS WEIGHTS:  Wt Readings from Last 3 Encounters:   07/14/25 105 kg (230 lb 8 oz)   07/29/24 101 kg (222 lb)   07/13/24 99.8 kg (220 lb)     EXAM:  General: Alert, attentive, in NAD  HEENT: Nares patent, oropharynx is Mallampati class 2-3 uvula: midline nonedematous   Dentition/Jaw: appropriate dentition;    Neck: no visible masses  Lungs:  no cough  Extremities: warm, well perfused, no visible edema, normal joints,   Skin: no visible rash   Neurologic: face symmetric   Psychiatric: Affect appropriate      OTHER RESULTS/DATA     Lab Review   CBC:   Lab Results   Component Value Date    WBC 7.1 07/13/2024    HGB 14.7 07/13/2024    HCT 42.9 07/13/2024    MCV 96 07/13/2024     (L) 07/13/2024    TSH:   Lab Results   Component Value Date    TSH 2.21 05/02/2023   ; BMP:   Lab Results   Component Value Date    GLUCOSE 111 (H) 07/13/2024    CALCIUM 9.9 07/13/2024 "     07/13/2024    K 3.8 07/13/2024    CO2 24 07/13/2024     07/13/2024    BUN 22 07/13/2024    CREATININE 1.37 (H) 07/10/2025        ASSESSMENT/PLAN   Mr. Moore is a 54 y.o. male  returning to the  Sleep Medicine Clinic for follow-up of ULISES on PAP therapy doing well with excellent compliance, ongoing perceived benefits from PAP therapy, and download visually reviewed which shows appropriate control of ULISES     Problem List and Plan/Orders  Problem List Items Addressed This Visit       Obstructive sleep apnea syndrome (Chronic)    Overview   Symptomatic with long standing snoring, newer apnea, un-refreshing quality of sleep and newer daytime sleepiness  Diagnostics: Severe ULISES by HSAT June 16, 2022: EDWARD 44 (35 if using 4% criteria)  Tx:  Resmed setup 9-22-22  DME: [facility]  Responsive to PAP thearpy with excellent usage and multiple benefits (more energy, less snoring, no more sore throat etc).            Other Visit Diagnoses         ULISES (obstructive sleep apnea)    -  Primary    Relevant Orders    Positive Airway Pressure (PAP) Therapy    Follow Up In Adult Sleep Medicine            CGI:  2-much improved     Recommendations/Plan/Management:  Continue AutoPAP 5- 16 cmH2O- EPR Fulltime EPR level 3  DME: [facility]  Has a travel machine, last year we requested it gets looked at and adjusted; they could not connect. So he would like to do this before his [place] trip coming up --> I reached out to Ludy Pritchard who helped communicate need for scheduling and they will reach out to pt--> told the patient while here today.   Adequate sleep duration and appropriate timing as he is doing.  Monitor BP at home.      Education: white coat BP   Followup: 12 months, provided contacts for interim care if needed.    Skye Hickey MD       [1]   Allergies  Allergen Reactions    Ragweed Unknown   [2]   Current Outpatient Medications:     allopurinol (Zyloprim) 300 mg tablet, Take 1 tablet (300 mg) by mouth once daily., Disp: 90  tablet, Rfl: 1    aspirin 81 mg EC tablet, Take 1 tablet (81 mg) by mouth once daily., Disp: , Rfl:     atorvastatin (Lipitor) 80 mg tablet, Take 1 tablet (80 mg) by mouth once daily., Disp: 90 tablet, Rfl: 3    ezetimibe (Zetia) 10 mg tablet, Take 1 tablet (10 mg) by mouth once daily., Disp: 90 tablet, Rfl: 3    lisinopril 20 mg tablet, Take 1 tablet (20 mg) by mouth once daily., Disp: 90 tablet, Rfl: 3    sildenafil (Revatio) 20 mg tablet, TAKE 2 TO 5 TABLETS BY MOUTH ONCE DAILY AS NEEDED FOR SEXUALY ACTIVITY, Disp: 50 tablet, Rfl: 11  [3]   Patient Active Problem List  Diagnosis    HTN (hypertension)    Gout    Hyperlipidemia    Obstructive sleep apnea syndrome    Vitamin D deficiency    Abnormal computed tomography scan    Lumbar radiculopathy    Overweight    Hip weakness    Arteriosclerosis of coronary artery    Atypical chest pain    Squamous cell carcinoma of skin of other parts of face    Neoplasm of uncertain behavior of skin    Lumbar disc herniation    Hospital discharge follow-up

## 2025-07-14 NOTE — PATIENT INSTRUCTIONS
"     NAME: Ryan Moore   DATE:  7/14/2025     Your Sleep Physician Today: Skye Hickey MD  Your Primary Care Physician: Christiano Beckett, DO      Thank you for coming to the Sleep Medicine Clinic today!  Below is a summary of your treatment plan, other important information, and our contact numbers:    TREATMENT PLAN     Continue PAP therapy  Adjust travel machine  Machine order and supplies are in for the year.      Follow-up Appointment:  1year     CONTACTING YOUR SLEEP MEDICINE DOCTOR- Getting in touch     Adult sleep office phone: [phone] (Maycolcristian Valles)  Sleep nurse support and medication follow-up: Chanelle Osman RN  [phone] or [email or web address]  Send a message through \"My Chart\",  [email or web address].    NOTE: [facility] message do not go directly to me, so there can be a delayed response.  If your issue is urgent, please call.      IMPORTANT PHONE NUMBERS     [facility]: [phone] ([phone])   for sleep testing (option 1) and sleep clinic appointments/offices (option 2)  Appointments (main scheduling lines):  [phone]  Other important offices:   ENT Sleep Surgery: [phone] or general ENT/Otolaryngology: [phone]  [employer] [facility] [employer]): [phone]    Labs: NOTE: that  started to use [employer] [facility] for labs you need to ensure that [facility] is a part of your insurance coverage. You can use this website if you need it: [email or web address]      Please do not hesitate to reach out if you have pending questions.    Skye Hickey MD   "

## 2025-08-06 ENCOUNTER — APPOINTMENT (OUTPATIENT)
Dept: CARDIOLOGY | Facility: CLINIC | Age: 55
End: 2025-08-06
Payer: COMMERCIAL

## 2025-08-28 ENCOUNTER — APPOINTMENT (OUTPATIENT)
Dept: RHEUMATOLOGY | Facility: CLINIC | Age: 55
End: 2025-08-28
Payer: COMMERCIAL

## 2025-09-02 PROBLEM — R29.898 HIP WEAKNESS: Status: RESOLVED | Noted: 2020-02-14 | Resolved: 2025-09-02

## 2025-09-02 PROBLEM — M10.9 GOUT: Status: RESOLVED | Noted: 2020-07-09 | Resolved: 2025-09-02

## 2025-09-02 PROBLEM — R93.89 ABNORMAL COMPUTED TOMOGRAPHY SCAN: Status: RESOLVED | Noted: 2020-07-09 | Resolved: 2025-09-02

## 2025-09-03 ENCOUNTER — APPOINTMENT (OUTPATIENT)
Dept: CARDIOLOGY | Facility: CLINIC | Age: 55
End: 2025-09-03
Payer: COMMERCIAL

## 2025-12-30 ENCOUNTER — APPOINTMENT (OUTPATIENT)
Dept: RHEUMATOLOGY | Facility: CLINIC | Age: 55
End: 2025-12-30
Payer: COMMERCIAL